# Patient Record
Sex: FEMALE | Race: WHITE | NOT HISPANIC OR LATINO | URBAN - METROPOLITAN AREA
[De-identification: names, ages, dates, MRNs, and addresses within clinical notes are randomized per-mention and may not be internally consistent; named-entity substitution may affect disease eponyms.]

---

## 2018-06-06 ENCOUNTER — EMERGENCY (EMERGENCY)
Facility: HOSPITAL | Age: 83
LOS: 0 days | Discharge: HOME | End: 2018-06-06
Attending: STUDENT IN AN ORGANIZED HEALTH CARE EDUCATION/TRAINING PROGRAM | Admitting: STUDENT IN AN ORGANIZED HEALTH CARE EDUCATION/TRAINING PROGRAM
Payer: MEDICARE

## 2018-06-06 VITALS
HEIGHT: 70 IN | OXYGEN SATURATION: 100 % | RESPIRATION RATE: 18 BRPM | HEART RATE: 74 BPM | WEIGHT: 190.04 LBS | TEMPERATURE: 98 F | SYSTOLIC BLOOD PRESSURE: 141 MMHG | DIASTOLIC BLOOD PRESSURE: 66 MMHG

## 2018-06-06 VITALS
DIASTOLIC BLOOD PRESSURE: 79 MMHG | OXYGEN SATURATION: 100 % | SYSTOLIC BLOOD PRESSURE: 114 MMHG | TEMPERATURE: 99 F | RESPIRATION RATE: 20 BRPM | HEART RATE: 78 BPM

## 2018-06-06 DIAGNOSIS — R53.1 WEAKNESS: ICD-10-CM

## 2018-06-06 DIAGNOSIS — R25.1 TREMOR, UNSPECIFIED: ICD-10-CM

## 2018-06-06 DIAGNOSIS — R42 DIZZINESS AND GIDDINESS: ICD-10-CM

## 2018-06-06 LAB
ANION GAP SERPL CALC-SCNC: 10 MMOL/L — SIGNIFICANT CHANGE UP (ref 5–17)
APPEARANCE UR: CLEAR — SIGNIFICANT CHANGE UP
BACTERIA # UR AUTO: ABNORMAL
BASOPHILS # BLD AUTO: 0.02 K/UL — SIGNIFICANT CHANGE UP (ref 0–0.2)
BASOPHILS NFR BLD AUTO: 0.3 % — SIGNIFICANT CHANGE UP (ref 0–2)
BILIRUB UR-MCNC: NEGATIVE — SIGNIFICANT CHANGE UP
BUN SERPL-MCNC: 12 MG/DL — SIGNIFICANT CHANGE UP (ref 7–23)
CALCIUM SERPL-MCNC: 9.3 MG/DL — SIGNIFICANT CHANGE UP (ref 8.5–10.1)
CHLORIDE SERPL-SCNC: 111 MMOL/L — HIGH (ref 96–108)
CO2 SERPL-SCNC: 24 MMOL/L — SIGNIFICANT CHANGE UP (ref 22–31)
COLOR SPEC: YELLOW — SIGNIFICANT CHANGE UP
CREAT SERPL-MCNC: 0.83 MG/DL — SIGNIFICANT CHANGE UP (ref 0.5–1.3)
DIFF PNL FLD: ABNORMAL
EOSINOPHIL # BLD AUTO: 0.06 K/UL — SIGNIFICANT CHANGE UP (ref 0–0.5)
EOSINOPHIL NFR BLD AUTO: 1 % — SIGNIFICANT CHANGE UP (ref 0–6)
EPI CELLS # UR: SIGNIFICANT CHANGE UP
GLUCOSE SERPL-MCNC: 99 MG/DL — SIGNIFICANT CHANGE UP (ref 70–99)
GLUCOSE UR QL: NEGATIVE MG/DL — SIGNIFICANT CHANGE UP
HCT VFR BLD CALC: 43 % — SIGNIFICANT CHANGE UP (ref 34.5–45)
HGB BLD-MCNC: 14.9 G/DL — SIGNIFICANT CHANGE UP (ref 11.5–15.5)
IMM GRANULOCYTES NFR BLD AUTO: 0.5 % — SIGNIFICANT CHANGE UP (ref 0–1.5)
KETONES UR-MCNC: NEGATIVE — SIGNIFICANT CHANGE UP
LEUKOCYTE ESTERASE UR-ACNC: ABNORMAL
LYMPHOCYTES # BLD AUTO: 1.56 K/UL — SIGNIFICANT CHANGE UP (ref 1–3.3)
LYMPHOCYTES # BLD AUTO: 25.3 % — SIGNIFICANT CHANGE UP (ref 13–44)
MCHC RBC-ENTMCNC: 30.9 PG — SIGNIFICANT CHANGE UP (ref 27–34)
MCHC RBC-ENTMCNC: 34.7 GM/DL — SIGNIFICANT CHANGE UP (ref 32–36)
MCV RBC AUTO: 89.2 FL — SIGNIFICANT CHANGE UP (ref 80–100)
MONOCYTES # BLD AUTO: 0.66 K/UL — SIGNIFICANT CHANGE UP (ref 0–0.9)
MONOCYTES NFR BLD AUTO: 10.7 % — SIGNIFICANT CHANGE UP (ref 2–14)
NEUTROPHILS # BLD AUTO: 3.84 K/UL — SIGNIFICANT CHANGE UP (ref 1.8–7.4)
NEUTROPHILS NFR BLD AUTO: 62.2 % — SIGNIFICANT CHANGE UP (ref 43–77)
NITRITE UR-MCNC: NEGATIVE — SIGNIFICANT CHANGE UP
NRBC # BLD: 0 /100 WBCS — SIGNIFICANT CHANGE UP (ref 0–0)
PH UR: 7 — SIGNIFICANT CHANGE UP (ref 5–8)
PLATELET # BLD AUTO: 148 K/UL — LOW (ref 150–400)
POTASSIUM SERPL-MCNC: 4.2 MMOL/L — SIGNIFICANT CHANGE UP (ref 3.5–5.3)
POTASSIUM SERPL-SCNC: 4.2 MMOL/L — SIGNIFICANT CHANGE UP (ref 3.5–5.3)
PROT UR-MCNC: 15 MG/DL
RBC # BLD: 4.82 M/UL — SIGNIFICANT CHANGE UP (ref 3.8–5.2)
RBC # FLD: 14.6 % — HIGH (ref 10.3–14.5)
RBC CASTS # UR COMP ASSIST: SIGNIFICANT CHANGE UP /HPF (ref 0–4)
SODIUM SERPL-SCNC: 145 MMOL/L — SIGNIFICANT CHANGE UP (ref 135–145)
SP GR SPEC: 1 — LOW (ref 1.01–1.02)
UROBILINOGEN FLD QL: NEGATIVE MG/DL — SIGNIFICANT CHANGE UP
WBC # BLD: 6.17 K/UL — SIGNIFICANT CHANGE UP (ref 3.8–10.5)
WBC # FLD AUTO: 6.17 K/UL — SIGNIFICANT CHANGE UP (ref 3.8–10.5)
WBC UR QL: SIGNIFICANT CHANGE UP

## 2018-06-06 PROCEDURE — 71045 X-RAY EXAM CHEST 1 VIEW: CPT | Mod: 26

## 2018-06-06 PROCEDURE — 99284 EMERGENCY DEPT VISIT MOD MDM: CPT

## 2018-06-06 PROCEDURE — 93010 ELECTROCARDIOGRAM REPORT: CPT

## 2018-06-06 NOTE — ED PROVIDER NOTE - MEDICAL DECISION MAKING DETAILS
82 y/o F with episode of lightheadedness and generalized weakness. Will order labs, EKG, CXR. 80 y/o F with episode of lightheadedness and generalized weakness. Will order labs, EKG, CXR, UA.

## 2018-06-06 NOTE — ED ADULT TRIAGE NOTE - CHIEF COMPLAINT QUOTE
Pt BIBEMS for an anxiety attack that as per EMS resolved but when patient went to stand up she became shaky then agreed to come to ED for evaluation

## 2018-06-06 NOTE — ED PROVIDER NOTE - OBJECTIVE STATEMENT
82 y/o F with PMHx of perfume allergies presents to ED c/o shakiness. Pt states she was in the car and her son and her were late to court. She states that she went in to give her name, worried about being late and she was shaking. She says when she sat down, she realized she was still shaking and started feeling "not so good" and asked to get some help. When she got up she felt dizzy (like he head was foggy). Pt denies any CP, dyspnea. As per son, he was parking the car and when he came back, she was in an ambulance shaking. As per nurse, pt has had 2 court dates in the past 2 weeks which might have caused her to get anxious and become shaky. Non-smoker. Pt denies being on any medication. No PCP. 82 y/o F with no pmhx presents to ED c/o shakiness. Pt states she was in the car and her son and her were late to court. She states that she went in to give her name, worried about being late and she was shaking. She says when she sat down, she realized she was still shaking and started feeling "not so good" and asked to get some help. When she got up she felt dizzy (like he head was foggy). Pt denies any CP, dyspnea. As per son, he was parking the car and when he came back, she was in an ambulance shaking. As per nurse, pt has had 2 court dates in the past 2 weeks which might have caused her to get anxious and become shaky. Non-smoker. Pt denies being on any medication. No PCP.

## 2018-06-06 NOTE — ED PROVIDER NOTE - PROGRESS NOTE DETAILS
Oksana DO: Patient feeling better at this time; neuro exam non focal; gait steady; CXR, UA negative for infectious source, EKG- no STEMI, labs WNL; instructed to f/u with PMD in 1-2 days without fail; strict return precautions given.

## 2018-06-06 NOTE — ED ADULT NURSE NOTE - CHPI ED SYMPTOMS NEG
no dizziness/no pain/no vomiting/no fever/no chills/no decreased eating/drinking/no nausea/no tingling/no weakness/no numbness

## 2018-06-06 NOTE — ED ADULT NURSE NOTE - OBJECTIVE STATEMENT
PT a+o presents to the ed for "shakiness", pt states it began while in court today and has had a recent episode of this occur within the past 2 weeks. No acute distress noted.

## 2018-06-07 LAB
CULTURE RESULTS: NO GROWTH — SIGNIFICANT CHANGE UP
SPECIMEN SOURCE: SIGNIFICANT CHANGE UP

## 2019-04-29 NOTE — ASU PATIENT PROFILE, ADULT - PMH
Cataract    H/O seasonal allergies    OA (osteoarthritis)  knees  Occasional tremors Anxiety    Cataract    H/O seasonal allergies    OA (osteoarthritis)  knees  Occasional tremors

## 2019-04-30 ENCOUNTER — OUTPATIENT (OUTPATIENT)
Dept: OUTPATIENT SERVICES | Facility: HOSPITAL | Age: 84
LOS: 1 days | Discharge: HOME | End: 2019-04-30

## 2019-04-30 VITALS
HEART RATE: 67 BPM | WEIGHT: 179.02 LBS | RESPIRATION RATE: 16 BRPM | TEMPERATURE: 99 F | SYSTOLIC BLOOD PRESSURE: 87 MMHG | HEIGHT: 69 IN | DIASTOLIC BLOOD PRESSURE: 64 MMHG | OXYGEN SATURATION: 100 %

## 2019-04-30 VITALS
SYSTOLIC BLOOD PRESSURE: 129 MMHG | RESPIRATION RATE: 16 BRPM | HEART RATE: 61 BPM | OXYGEN SATURATION: 97 % | DIASTOLIC BLOOD PRESSURE: 44 MMHG

## 2019-04-30 RX ORDER — ACETAMINOPHEN 500 MG
650 TABLET ORAL EVERY 6 HOURS
Qty: 0 | Refills: 0 | Status: DISCONTINUED | OUTPATIENT
Start: 2019-04-30 | End: 2019-05-15

## 2019-04-30 RX ORDER — ACETAMINOPHEN 500 MG
2 TABLET ORAL
Qty: 0 | Refills: 0 | DISCHARGE
Start: 2019-04-30

## 2019-04-30 RX ORDER — PHENYLEPHRINE HCL 2.5 %
1 DROPS OPHTHALMIC (EYE)
Qty: 0 | Refills: 0 | Status: COMPLETED | OUTPATIENT
Start: 2019-04-30 | End: 2019-04-30

## 2019-04-30 RX ORDER — OFLOXACIN 0.3 %
1 DROPS OPHTHALMIC (EYE)
Qty: 0 | Refills: 0 | Status: COMPLETED | OUTPATIENT
Start: 2019-04-30 | End: 2019-04-30

## 2019-04-30 RX ORDER — TROPICAMIDE 1 %
1 DROPS OPHTHALMIC (EYE)
Qty: 0 | Refills: 0 | Status: COMPLETED | OUTPATIENT
Start: 2019-04-30 | End: 2019-04-30

## 2019-04-30 RX ADMIN — Medication 1 DROP(S): at 07:34

## 2019-04-30 RX ADMIN — Medication 1 DROP(S): at 07:49

## 2019-04-30 RX ADMIN — Medication 1 DROP(S): at 07:35

## 2019-04-30 RX ADMIN — Medication 1 DROP(S): at 07:44

## 2019-04-30 RX ADMIN — Medication 1 DROP(S): at 07:43

## 2019-04-30 RX ADMIN — Medication 1 DROP(S): at 07:48

## 2019-04-30 NOTE — BRIEF OPERATIVE NOTE - NSICDXBRIEFPROCEDURE_GEN_ALL_CORE_FT
PROCEDURES:  Phacoemulsification, cataract, complex, with toric IOL insertion 30-Apr-2019 09:26:04  BONG MADDOX

## 2019-05-02 DIAGNOSIS — Z91.040 LATEX ALLERGY STATUS: ICD-10-CM

## 2019-05-02 DIAGNOSIS — J30.2 OTHER SEASONAL ALLERGIC RHINITIS: ICD-10-CM

## 2019-05-02 DIAGNOSIS — H25.12 AGE-RELATED NUCLEAR CATARACT, LEFT EYE: ICD-10-CM

## 2019-05-02 DIAGNOSIS — R25.1 TREMOR, UNSPECIFIED: ICD-10-CM

## 2019-05-02 DIAGNOSIS — H52.202 UNSPECIFIED ASTIGMATISM, LEFT EYE: ICD-10-CM

## 2019-05-02 DIAGNOSIS — Z83.3 FAMILY HISTORY OF DIABETES MELLITUS: ICD-10-CM

## 2019-05-02 DIAGNOSIS — Z79.82 LONG TERM (CURRENT) USE OF ASPIRIN: ICD-10-CM

## 2019-05-02 DIAGNOSIS — F41.9 ANXIETY DISORDER, UNSPECIFIED: ICD-10-CM

## 2019-05-14 PROBLEM — Z88.9 ALLERGY STATUS TO UNSPECIFIED DRUGS, MEDICAMENTS AND BIOLOGICAL SUBSTANCES: Chronic | Status: ACTIVE | Noted: 2019-04-29

## 2019-05-14 PROBLEM — F41.9 ANXIETY DISORDER, UNSPECIFIED: Chronic | Status: ACTIVE | Noted: 2019-04-30

## 2019-05-14 PROBLEM — H26.9 UNSPECIFIED CATARACT: Chronic | Status: ACTIVE | Noted: 2019-04-29

## 2019-05-14 PROBLEM — M19.90 UNSPECIFIED OSTEOARTHRITIS, UNSPECIFIED SITE: Chronic | Status: ACTIVE | Noted: 2019-04-29

## 2019-05-14 PROBLEM — R25.1 TREMOR, UNSPECIFIED: Chronic | Status: ACTIVE | Noted: 2019-04-29

## 2019-05-17 RX ORDER — SODIUM CHLORIDE 9 MG/ML
1000 INJECTION, SOLUTION INTRAVENOUS
Refills: 0 | Status: DISCONTINUED | OUTPATIENT
Start: 2019-05-21 | End: 2019-06-05

## 2019-05-17 RX ORDER — OFLOXACIN 0.3 %
1 DROPS OPHTHALMIC (EYE)
Refills: 0 | Status: DISCONTINUED | OUTPATIENT
Start: 2019-05-21 | End: 2019-06-05

## 2019-05-20 VITALS
SYSTOLIC BLOOD PRESSURE: 134 MMHG | WEIGHT: 179.02 LBS | OXYGEN SATURATION: 100 % | TEMPERATURE: 98 F | HEART RATE: 61 BPM | DIASTOLIC BLOOD PRESSURE: 62 MMHG | HEIGHT: 68 IN | RESPIRATION RATE: 17 BRPM

## 2019-05-21 ENCOUNTER — OUTPATIENT (OUTPATIENT)
Dept: OUTPATIENT SERVICES | Facility: HOSPITAL | Age: 84
LOS: 1 days | Discharge: HOME | End: 2019-05-21

## 2019-05-21 VITALS
DIASTOLIC BLOOD PRESSURE: 70 MMHG | RESPIRATION RATE: 16 BRPM | HEART RATE: 67 BPM | OXYGEN SATURATION: 97 % | SYSTOLIC BLOOD PRESSURE: 139 MMHG

## 2019-05-21 DIAGNOSIS — H25.9 UNSPECIFIED AGE-RELATED CATARACT: Chronic | ICD-10-CM

## 2019-05-21 DIAGNOSIS — H25.11 AGE-RELATED NUCLEAR CATARACT, RIGHT EYE: ICD-10-CM

## 2019-05-21 RX ORDER — PHENYLEPHRINE HCL 2.5 %
1 DROPS OPHTHALMIC (EYE)
Refills: 0 | Status: COMPLETED | OUTPATIENT
Start: 2019-05-21 | End: 2019-05-21

## 2019-05-21 RX ORDER — ACETAMINOPHEN 500 MG
650 TABLET ORAL EVERY 6 HOURS
Refills: 0 | Status: DISCONTINUED | OUTPATIENT
Start: 2019-05-21 | End: 2019-06-05

## 2019-05-21 RX ORDER — TROPICAMIDE 1 %
1 DROPS OPHTHALMIC (EYE)
Refills: 0 | Status: COMPLETED | OUTPATIENT
Start: 2019-05-21 | End: 2019-05-21

## 2019-05-21 RX ADMIN — Medication 1 DROP(S): at 08:35

## 2019-05-21 RX ADMIN — Medication 1 DROP(S): at 08:27

## 2019-05-21 RX ADMIN — Medication 1 DROP(S): at 08:34

## 2019-05-21 RX ADMIN — Medication 1 DROP(S): at 08:40

## 2019-05-21 RX ADMIN — Medication 1 DROP(S): at 08:28

## 2019-05-21 NOTE — ASU DISCHARGE PLAN (ADULT/PEDIATRIC) - CARE PROVIDER_API CALL
Kathya Rhodes)  Ophthalmology  [address] [place] [address]  Phone: [phone]  Fax: [phone]  Follow Up Time:

## 2019-05-26 DIAGNOSIS — H35.30 UNSPECIFIED MACULAR DEGENERATION: ICD-10-CM

## 2019-05-26 DIAGNOSIS — F41.9 ANXIETY DISORDER, UNSPECIFIED: ICD-10-CM

## 2019-05-26 DIAGNOSIS — Z91.040 LATEX ALLERGY STATUS: ICD-10-CM

## 2019-05-26 DIAGNOSIS — Z83.3 FAMILY HISTORY OF DIABETES MELLITUS: ICD-10-CM

## 2019-05-26 DIAGNOSIS — R00.2 PALPITATIONS: ICD-10-CM

## 2019-05-26 DIAGNOSIS — Z96.1 PRESENCE OF INTRAOCULAR LENS: ICD-10-CM

## 2019-05-26 DIAGNOSIS — H25.11 AGE-RELATED NUCLEAR CATARACT, RIGHT EYE: ICD-10-CM

## 2020-11-09 NOTE — ASU PATIENT PROFILE, ADULT - FALL HARM RISK TYPE OF ASSESSMENT
Alert-The patient is alert, awake and responds to voice. The patient is oriented to time, place, and person. The triage nurse is able to obtain subjective information.
Admission

## 2023-01-03 ENCOUNTER — EMERGENCY (EMERGENCY)
Facility: HOSPITAL | Age: 88
LOS: 0 days | Discharge: HOME | End: 2023-01-03
Attending: EMERGENCY MEDICINE
Payer: MEDICARE

## 2023-01-03 VITALS
TEMPERATURE: 99 F | SYSTOLIC BLOOD PRESSURE: 100 MMHG | DIASTOLIC BLOOD PRESSURE: 68 MMHG | HEART RATE: 87 BPM | RESPIRATION RATE: 20 BRPM | OXYGEN SATURATION: 94 %

## 2023-01-03 VITALS
RESPIRATION RATE: 22 BRPM | SYSTOLIC BLOOD PRESSURE: 130 MMHG | OXYGEN SATURATION: 94 % | DIASTOLIC BLOOD PRESSURE: 72 MMHG | WEIGHT: 164.91 LBS | HEART RATE: 109 BPM | TEMPERATURE: 99 F | HEIGHT: 68 IN

## 2023-01-03 DIAGNOSIS — R25.1 TREMOR, UNSPECIFIED: ICD-10-CM

## 2023-01-03 DIAGNOSIS — Z20.822 CONTACT WITH AND (SUSPECTED) EXPOSURE TO COVID-19: ICD-10-CM

## 2023-01-03 DIAGNOSIS — R05.9 COUGH, UNSPECIFIED: ICD-10-CM

## 2023-01-03 DIAGNOSIS — M17.0 BILATERAL PRIMARY OSTEOARTHRITIS OF KNEE: ICD-10-CM

## 2023-01-03 DIAGNOSIS — R41.82 ALTERED MENTAL STATUS, UNSPECIFIED: ICD-10-CM

## 2023-01-03 DIAGNOSIS — R00.0 TACHYCARDIA, UNSPECIFIED: ICD-10-CM

## 2023-01-03 DIAGNOSIS — J11.1 INFLUENZA DUE TO UNIDENTIFIED INFLUENZA VIRUS WITH OTHER RESPIRATORY MANIFESTATIONS: ICD-10-CM

## 2023-01-03 DIAGNOSIS — Z91.040 LATEX ALLERGY STATUS: ICD-10-CM

## 2023-01-03 DIAGNOSIS — R50.9 FEVER, UNSPECIFIED: ICD-10-CM

## 2023-01-03 DIAGNOSIS — F41.9 ANXIETY DISORDER, UNSPECIFIED: ICD-10-CM

## 2023-01-03 DIAGNOSIS — H25.9 UNSPECIFIED AGE-RELATED CATARACT: Chronic | ICD-10-CM

## 2023-01-03 DIAGNOSIS — R06.02 SHORTNESS OF BREATH: ICD-10-CM

## 2023-01-03 LAB
ALBUMIN SERPL ELPH-MCNC: 3.9 G/DL — SIGNIFICANT CHANGE UP (ref 3.3–5)
ALP SERPL-CCNC: 54 U/L — SIGNIFICANT CHANGE UP (ref 40–120)
ALT FLD-CCNC: 15 U/L — SIGNIFICANT CHANGE UP (ref 12–78)
ANION GAP SERPL CALC-SCNC: 13 MMOL/L — SIGNIFICANT CHANGE UP (ref 5–17)
APTT BLD: 37.7 SEC — HIGH (ref 27.5–35.5)
AST SERPL-CCNC: 26 U/L — SIGNIFICANT CHANGE UP (ref 15–37)
BASOPHILS # BLD AUTO: 0.03 K/UL — SIGNIFICANT CHANGE UP (ref 0–0.2)
BASOPHILS NFR BLD AUTO: 0.3 % — SIGNIFICANT CHANGE UP (ref 0–2)
BILIRUB SERPL-MCNC: 1.3 MG/DL — HIGH (ref 0.2–1.2)
BUN SERPL-MCNC: 17 MG/DL — SIGNIFICANT CHANGE UP (ref 7–23)
CALCIUM SERPL-MCNC: 8.6 MG/DL — SIGNIFICANT CHANGE UP (ref 8.5–10.1)
CHLORIDE SERPL-SCNC: 103 MMOL/L — SIGNIFICANT CHANGE UP (ref 96–108)
CO2 SERPL-SCNC: 22 MMOL/L — SIGNIFICANT CHANGE UP (ref 22–31)
CREAT SERPL-MCNC: 0.81 MG/DL — SIGNIFICANT CHANGE UP (ref 0.5–1.3)
EGFR: 70 ML/MIN/1.73M2 — SIGNIFICANT CHANGE UP
EOSINOPHIL # BLD AUTO: 0 K/UL — SIGNIFICANT CHANGE UP (ref 0–0.5)
EOSINOPHIL NFR BLD AUTO: 0 % — SIGNIFICANT CHANGE UP (ref 0–6)
FLUAV H1 2009 PAND RNA SPEC QL NAA+PROBE: DETECTED
GLUCOSE SERPL-MCNC: 149 MG/DL — HIGH (ref 70–99)
HCT VFR BLD CALC: 43.3 % — SIGNIFICANT CHANGE UP (ref 34.5–45)
HGB BLD-MCNC: 15.3 G/DL — SIGNIFICANT CHANGE UP (ref 11.5–15.5)
IMM GRANULOCYTES NFR BLD AUTO: 1.1 % — HIGH (ref 0–0.9)
INR BLD: 1.24 RATIO — HIGH (ref 0.88–1.16)
LACTATE SERPL-SCNC: 1.2 MMOL/L — SIGNIFICANT CHANGE UP (ref 0.7–2)
LYMPHOCYTES # BLD AUTO: 0.88 K/UL — LOW (ref 1–3.3)
LYMPHOCYTES # BLD AUTO: 9.8 % — LOW (ref 13–44)
MANUAL SMEAR VERIFICATION: SIGNIFICANT CHANGE UP
MCHC RBC-ENTMCNC: 29.8 PG — SIGNIFICANT CHANGE UP (ref 27–34)
MCHC RBC-ENTMCNC: 35.3 GM/DL — SIGNIFICANT CHANGE UP (ref 32–36)
MCV RBC AUTO: 84.4 FL — SIGNIFICANT CHANGE UP (ref 80–100)
MONOCYTES # BLD AUTO: 1.85 K/UL — HIGH (ref 0–0.9)
MONOCYTES NFR BLD AUTO: 20.7 % — HIGH (ref 2–14)
NEUTROPHILS # BLD AUTO: 6.09 K/UL — SIGNIFICANT CHANGE UP (ref 1.8–7.4)
NEUTROPHILS NFR BLD AUTO: 68.1 % — SIGNIFICANT CHANGE UP (ref 43–77)
PLAT MORPH BLD: NORMAL — SIGNIFICANT CHANGE UP
PLATELET # BLD AUTO: 129 K/UL — LOW (ref 150–400)
POTASSIUM SERPL-MCNC: 3.4 MMOL/L — LOW (ref 3.5–5.3)
POTASSIUM SERPL-SCNC: 3.4 MMOL/L — LOW (ref 3.5–5.3)
PROT SERPL-MCNC: 7.2 GM/DL — SIGNIFICANT CHANGE UP (ref 6–8.3)
PROTHROM AB SERPL-ACNC: 14.4 SEC — HIGH (ref 10.5–13.4)
RAPID RVP RESULT: DETECTED
RBC # BLD: 5.13 M/UL — SIGNIFICANT CHANGE UP (ref 3.8–5.2)
RBC # FLD: 14.4 % — SIGNIFICANT CHANGE UP (ref 10.3–14.5)
RBC BLD AUTO: SIGNIFICANT CHANGE UP
SARS-COV-2 RNA SPEC QL NAA+PROBE: SIGNIFICANT CHANGE UP
SODIUM SERPL-SCNC: 138 MMOL/L — SIGNIFICANT CHANGE UP (ref 135–145)
TROPONIN I, HIGH SENSITIVITY RESULT: 32.3 NG/L — SIGNIFICANT CHANGE UP
WBC # BLD: 8.95 K/UL — SIGNIFICANT CHANGE UP (ref 3.8–10.5)
WBC # FLD AUTO: 8.95 K/UL — SIGNIFICANT CHANGE UP (ref 3.8–10.5)

## 2023-01-03 PROCEDURE — 71045 X-RAY EXAM CHEST 1 VIEW: CPT | Mod: 26

## 2023-01-03 PROCEDURE — 85730 THROMBOPLASTIN TIME PARTIAL: CPT

## 2023-01-03 PROCEDURE — 71045 X-RAY EXAM CHEST 1 VIEW: CPT

## 2023-01-03 PROCEDURE — 99285 EMERGENCY DEPT VISIT HI MDM: CPT

## 2023-01-03 PROCEDURE — 36415 COLL VENOUS BLD VENIPUNCTURE: CPT

## 2023-01-03 PROCEDURE — 80053 COMPREHEN METABOLIC PANEL: CPT

## 2023-01-03 PROCEDURE — 0225U NFCT DS DNA&RNA 21 SARSCOV2: CPT

## 2023-01-03 PROCEDURE — 83605 ASSAY OF LACTIC ACID: CPT

## 2023-01-03 PROCEDURE — 87040 BLOOD CULTURE FOR BACTERIA: CPT

## 2023-01-03 PROCEDURE — 85025 COMPLETE CBC W/AUTO DIFF WBC: CPT

## 2023-01-03 PROCEDURE — 99285 EMERGENCY DEPT VISIT HI MDM: CPT | Mod: 25

## 2023-01-03 PROCEDURE — 93005 ELECTROCARDIOGRAM TRACING: CPT

## 2023-01-03 PROCEDURE — 84484 ASSAY OF TROPONIN QUANT: CPT

## 2023-01-03 PROCEDURE — 93010 ELECTROCARDIOGRAM REPORT: CPT

## 2023-01-03 PROCEDURE — 85610 PROTHROMBIN TIME: CPT

## 2023-01-03 RX ORDER — SODIUM CHLORIDE 9 MG/ML
1000 INJECTION INTRAMUSCULAR; INTRAVENOUS; SUBCUTANEOUS ONCE
Refills: 0 | Status: COMPLETED | OUTPATIENT
Start: 2023-01-03 | End: 2023-01-03

## 2023-01-03 RX ADMIN — SODIUM CHLORIDE 1000 MILLILITER(S): 9 INJECTION INTRAMUSCULAR; INTRAVENOUS; SUBCUTANEOUS at 17:13

## 2023-01-03 NOTE — ED PROVIDER NOTE - PATIENT PORTAL LINK FT
You can access the FollowMyHealth Patient Portal offered by [facility] by registering at the following website: [email or web address]. By joining [employer]’s FollowMyHealth portal, you will also be able to view your health information using other applications (apps) compatible with our system.

## 2023-01-03 NOTE — ED PROVIDER NOTE - NSFOLLOWUPINSTRUCTIONS_ED_ALL_ED_FT
Influenza, Adult      Influenza, also called "the flu," is a viral infection that mainly affects the respiratory tract. This includes the lungs, nose, and throat. The flu spreads easily from person to person (is contagious). It causes common cold symptoms, along with high fever and body aches.      What are the causes?    This condition is caused by the influenza virus. You can get the virus by:  •Breathing in droplets that are in the air from an infected person's cough or sneeze.      •Touching something that has the virus on it (has been contaminated) and then touching your mouth, nose, or eyes.        What increases the risk?    The following factors may make you more likely to get the flu:  •Not washing or sanitizing your hands often.      •Having close contact with many people during cold and flu season.      •Touching your mouth, eyes, or nose without first washing or sanitizing your hands.      •Not getting an annual flu shot.      You may have a higher risk for the flu, including serious problems, such as a lung infection (pneumonia), if you:  •Are older than 65.      •Are pregnant.      •Have a weakened disease-fighting system (immune system). This includes people who have HIV or AIDS, are on chemotherapy, or are taking medicines that reduce (suppress) the immune system.      •Have a long-term (chronic) illness, such as heart disease, kidney disease, diabetes, or lung disease.      •Have a liver disorder.      •Are severely overweight (morbidly obese).      •Have anemia.      •Have asthma.        What are the signs or symptoms?    Symptoms of this condition usually begin suddenly and last 4–14 days. These may include:  •Fever and chills.      •Headaches, body aches, or muscle aches.      •Sore throat.      •Cough.      •Runny or stuffy (congested) nose.      •Chest discomfort.      •Poor appetite.      •Weakness or fatigue.      •Dizziness.      •Nausea or vomiting.        How is this diagnosed?    This condition may be diagnosed based on:  •Your symptoms and medical history.      •A physical exam.       •Swabbing your nose or throat and testing the fluid for the influenza virus.        How is this treated?    If the flu is diagnosed early, you can be treated with antiviral medicine that is given by mouth (orally) or through an IV. This can help reduce how severe the illness is and how long it lasts.    Taking care of yourself at home can help relieve symptoms. Your health care provider may recommend:  •Taking over-the-counter medicines.      •Drinking plenty of fluids.      In many cases, the flu goes away on its own. If you have severe symptoms or complications, you may be treated in a hospital.      Follow these instructions at home:    Activity     •Rest as needed and get plenty of sleep.      •Stay home from work or school as told by your health care provider. Unless you are visiting your health care provider, avoid leaving home until your fever has been gone for 24 hours without taking medicine.      Eating and drinking     •Take an oral rehydration solution (ORS). This is a drink that is sold at pharmacies and retail stores.      •Drink enough fluid to keep your urine pale yellow.      •Drink clear fluids in small amounts as you are able. Clear fluids include water, ice chips, fruit juice mixed with water, and low-calorie sports drinks.      •Eat bland, easy-to-digest foods in small amounts as you are able. These foods include bananas, applesauce, rice, lean meats, toast, and crackers.      •Avoid drinking fluids that contain a lot of sugar or caffeine, such as energy drinks, regular sports drinks, and soda.      •Avoid alcohol.      •Avoid spicy or fatty foods.        General instructions                   •Take over-the-counter and prescription medicines only as told by your health care provider.    •Use a cool mist humidifier to add humidity to the air in your home. This can make it easier to breathe.  •When using a cool mist humidifier, clean it daily. Empty the water and replace it with clean water.        •Cover your mouth and nose when you cough or sneeze.      •Wash your hands with soap and water often and for at least 20 seconds, especially after you cough or sneeze. If soap and water are not available, use alcohol-based hand .      •Keep all follow-up visits. This is important.        How is this prevented?     •Get an annual flu shot. This is usually available in late summer, fall, or winter. Ask your health care provider when you should get your flu shot.      •Avoid contact with people who are sick during cold and flu season. This is generally fall and winter.        Contact a health care provider if:    •You develop new symptoms.    •You have:  •Chest pain.      •Diarrhea.      •A fever.        •Your cough gets worse.      •You produce more mucus.      •You feel nauseous or you vomit.        Get help right away if you:    •Develop shortness of breath or have difficulty breathing.      •Have skin or nails that turn a bluish color.      •Have severe pain or stiffness in your neck.      •Develop a sudden headache or sudden pain in your face or ear.      •Cannot eat or drink without vomiting.      These symptoms may represent a serious problem that is an emergency. Do not wait to see if the symptoms will go away. Get medical help right away. Call your local emergency services (911 in the [place] Do not drive yourself to the hospital.       Summary    •Influenza, also called "the flu," is a viral infection that primarily affects your respiratory tract.      •Symptoms of the flu usually begin suddenly and last 4–14 days.      •Getting an annual flu shot is the best way to prevent getting the flu.      •Stay home from work or school as told by your health care provider. Unless you are visiting your health care provider, avoid leaving home until your fever has been gone for 24 hours without taking medicine.      •Keep all follow-up visits. This is important.      This information is not intended to replace advice given to you by your health care provider. Make sure you discuss any questions you have with your health care provider.      Document Revised: 08/06/2021 Document Reviewed: 08/06/2021    [employer] Patient Education © 2022 [employer]

## 2023-01-03 NOTE — ED PROVIDER NOTE - NSICDXPASTMEDICALHX_GEN_ALL_CORE_FT
PAST MEDICAL HISTORY:  Anxiety     Cataract     H/O seasonal allergies     OA (osteoarthritis) knees    Occasional tremors

## 2023-01-03 NOTE — ED ADULT TRIAGE NOTE - CHIEF COMPLAINT QUOTE
BIBEMS for fever 102 and productive cough. pt is at baseline mental status, diaphoretic and tachypneic in triage.

## 2023-01-03 NOTE — ED ADULT NURSE NOTE - OBJECTIVE STATEMENT
Pt BIBEMS for fever 102 and productive cough. pt is at baseline mental status, aox2 forgetful. Pt daughter at bedside. Py complains of cough, SOB and wheezing x2 days. Daughter states her mother is not at her baseline mentation. Denies abd pain, CP, LE swelling. +sick

## 2023-01-03 NOTE — ED PROVIDER NOTE - NSICDXPASTSURGICALHX_GEN_ALL_CORE_FT
PAST SURGICAL HISTORY:  Age-related cataract of both eyes, unspecified age-related cataract type L

## 2023-01-03 NOTE — ED PROVIDER NOTE - OBJECTIVE STATEMENT
GI Discharge Instructions Endoscopy      9/10/2020    During your exam, the physician:    Removed polyps and Lab results will be called/mailed to you within 7-10 days.  If you have not heard from the doctor within 10 days, call the office for results:  Dr. SARA Bae    DIET INSTRUCTIONS:  Resume your regular diet    PRESCRIPTIONS/MEDICATIONS  No new prescriptions given today    A RESPONSIBLE ADULT MUST ACCOMPANY YOU AND DRIVE YOU HOME    You had the following procedure(s) today:   Endoscopic Ultrasound    1. If a biopsy and/or a polyp removal was performed today.   There is no need to hold any aspirin or anti-inflammatory medications.   2. Following sedation, your judgement, perception and coordination are impaired.      Therefore, TODAY:  · Do not drive.  Do not return to work today.  · Do not operate appliances or machinery that require quick reaction time for 24 hours.  · Do not sign legal documents or be involved in work decisions for 24 hours.  · Do not smoke or drink alcoholic beverages for 24 hours.  · Plan to spend a few hours resting before resuming your normal routine    Please call your physician in the event that you experience any of the following or proceed to the nearest hospital in the event of an emergency:     UPPER ENDOSCOPY:    Difficulty swallowing or breathing  Neck swelling  Excessive pain, you may have mild chest pain or discomfort which should pass within 1-2 hours with the passage of air.  Nausea or Vomiting  Abdominal distention  Fever  A mild sore throat may follow this procedure.  Warm salt-water gargle or lozenges may relieve your discomfort.  ..    If you have any questions or concerns, contact Dr. SARA Bae.    ADDITIONAL INSTRUCTIONS:   Recommendation:  Follow-up biopsy report.  No need for further follow-up with the endoscopic ultrasound for gastric lesion.  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 Coffey       87 y/o female with a PMHx of anxiety, cataracts, seasonal allergies, OA, occasional tremor presents to the ED c/o fever, Tmax 103F, cough, SOB and wheezing x2 days. Daughter reports pt has been more altered. Denies abd pain, CP, LE swelling. +sick contacts. No other complaints at this time.

## 2023-01-08 LAB
CULTURE RESULTS: SIGNIFICANT CHANGE UP
CULTURE RESULTS: SIGNIFICANT CHANGE UP
SPECIMEN SOURCE: SIGNIFICANT CHANGE UP
SPECIMEN SOURCE: SIGNIFICANT CHANGE UP

## 2023-01-15 NOTE — ED POST DISCHARGE NOTE - DETAILS
LMTCB on home number to give results- KAILEE RYDER spoke to pt daughter she is aware of her labs and imaging results and will fu with [facility] KAILEE RYDER

## 2023-10-12 ENCOUNTER — EMERGENCY (EMERGENCY)
Facility: HOSPITAL | Age: 88
LOS: 0 days | Discharge: HOME | End: 2023-10-12
Attending: STUDENT IN AN ORGANIZED HEALTH CARE EDUCATION/TRAINING PROGRAM
Payer: MEDICARE

## 2023-10-12 VITALS
DIASTOLIC BLOOD PRESSURE: 94 MMHG | SYSTOLIC BLOOD PRESSURE: 129 MMHG | RESPIRATION RATE: 18 BRPM | OXYGEN SATURATION: 100 % | HEART RATE: 54 BPM | TEMPERATURE: 99 F

## 2023-10-12 VITALS — WEIGHT: 175.05 LBS | HEIGHT: 67 IN
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
## 2023-10-12 DIAGNOSIS — M17.0 BILATERAL PRIMARY OSTEOARTHRITIS OF KNEE: ICD-10-CM

## 2023-10-12 DIAGNOSIS — H25.9 UNSPECIFIED AGE-RELATED CATARACT: Chronic | ICD-10-CM

## 2023-10-12 DIAGNOSIS — R07.81 PLEURODYNIA: ICD-10-CM
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
## 2023-10-12 DIAGNOSIS — H26.9 UNSPECIFIED CATARACT: ICD-10-CM

## 2023-10-12 DIAGNOSIS — Z91.040 LATEX ALLERGY STATUS: ICD-10-CM

## 2023-10-12 DIAGNOSIS — T14.8XXA OTHER INJURY OF UNSPECIFIED BODY REGION, INITIAL ENCOUNTER: ICD-10-CM

## 2023-10-12 DIAGNOSIS — F41.9 ANXIETY DISORDER, UNSPECIFIED: ICD-10-CM

## 2023-10-12 PROCEDURE — 99284 EMERGENCY DEPT VISIT MOD MDM: CPT | Mod: 25

## 2023-10-12 PROCEDURE — 71250 CT THORAX DX C-: CPT | Mod: MA

## 2023-10-12 PROCEDURE — 71250 CT THORAX DX C-: CPT | Mod: 26,MA

## 2023-10-12 PROCEDURE — 99284 EMERGENCY DEPT VISIT MOD MDM: CPT

## 2023-10-12 RX ORDER — ACETAMINOPHEN 500 MG
650 TABLET ORAL ONCE
Refills: 0 | Status: COMPLETED | OUTPATIENT
Start: 2023-10-12 | End: 2023-10-12

## 2023-10-12 NOTE — ED ADULT NURSE NOTE - OBJECTIVE STATEMENT
Pt is a 87 y/o female who present to the ED with c/o right rib pain x 2 days.  Pt states she was leaning over to grab something and twisted the wrong way and feels like she fractured her rib. Pt denies N/V, chest pain or SOB. Pt is a 89 y/o female who present to the ED with c/o right rib pain x 2 days.  Pt states she was leaning over to grab something and twisted the wrong way and feels like she fractured her rib. Pt denies N/V, chest pain or SOB. Pt daughter is at the bedside. Pt denies any other complaints.

## 2023-10-12 NOTE — ED ADULT NURSE NOTE - NSFALLHARMRISKINTERV_ED_ALL_ED

## 2023-10-12 NOTE — ED PROVIDER NOTE - OBJECTIVE STATEMENT
87 y/o female with no pertinent PMHx not on any daily medications, baseline ambulates with cane on right side presents to the ED c/o right lower rib pain. Pt reports about 2 days ago bent over to pick something from the floor. Pt felt like she was going to fall, reached over to grab on to bed with her RUE to stop her fall and twisted the right side of her torso. Denies fall, headstrike, or direct injury to the right side. Since then pt with worsening right rib pain that is worse with lateral movement of the torso. Took aspirin at 9AM yesterday with no relief. Denies numbness or tingling of the UEs, neck pain, back pain, nausea, vomiting, fatigue, urinary incontinence, or chest pain.

## 2023-10-12 NOTE — ED PROVIDER NOTE - NSFOLLOWUPINSTRUCTIONS_ED_ALL_ED_FT
Follow-up with her primary care doctor regarding her CAT scan results.    Seek immediate medical assistance for any new or worsening symptoms. If you have issues obtaining follow up, please call: [phone] or [phone]  to obtain a doctor or specialist who takes your insurance in your area.    Please take 600 mg of Ibuprofen (aka Motrin, Advil) and/or 650 mg Acetaminophen (aka Tylenol) every 6 hours, as needed, for mild-moderate pain.    Please do not take these medications if you do not have pain or if you have any history of bleeding disorders, kidney or liver disease.   Do not use ibuprofen if you are on blood thinners (anti-coagulation).

## 2023-10-12 NOTE — ED PROVIDER NOTE - PATIENT PORTAL LINK FT
You can access the FollowMyHealth Patient Portal offered by [facility] by registering at the following website: [email or web address]. By joining [employer]’s FollowMyHealth portal, you will also be able to view your health information using other applications (apps) compatible with our system.

## 2023-10-12 NOTE — ED PROVIDER NOTE - CLINICAL SUMMARY MEDICAL DECISION MAKING FREE TEXT BOX
87 y/o female no PMHx presenting with right-sided rib pain following twist injury 3 days ago. On exam pt with pain with ROM and tenderness to the region.  ***** 87 y/o female no PMHx presenting with right-sided rib pain following twist injury 3 days ago. On exam pt with pain with ROM and tenderness to the region.   CT negative for fracture but there is an incidental finding which the patient will follow-up with her primary care doctor for.

## 2023-10-12 NOTE — ED PROVIDER NOTE - MUSCULOSKELETAL, MLM
Pain with rotation of the spinal rotation of the right side. Mild pain with spinal flexion. Mild TTP of the right ribs.

## 2023-10-12 NOTE — ED PROVIDER NOTE - NSICDXPASTSURGICALHX_GEN_ALL_CORE_FT
PAST SURGICAL HISTORY:  Age-related cataract of both eyes, unspecified age-related cataract type L

## 2023-10-12 NOTE — ED ADULT TRIAGE NOTE - CHIEF COMPLAINT QUOTE
Pt presents to ER c/o right lower rib pain. Pt reports 3 days PTA she twisted her body then sat down and it began to hurt. Denies injury/fall/bloodthinners

## 2024-07-22 ENCOUNTER — EMERGENCY (EMERGENCY)
Facility: HOSPITAL | Age: 89
LOS: 0 days | Discharge: HOME | End: 2024-07-23
Attending: EMERGENCY MEDICINE
Payer: MEDICARE

## 2024-07-22 VITALS
DIASTOLIC BLOOD PRESSURE: 96 MMHG | RESPIRATION RATE: 16 BRPM | TEMPERATURE: 99 F | SYSTOLIC BLOOD PRESSURE: 169 MMHG | OXYGEN SATURATION: 95 % | HEART RATE: 100 BPM

## 2024-07-22 DIAGNOSIS — H25.9 UNSPECIFIED AGE-RELATED CATARACT: Chronic | ICD-10-CM

## 2024-07-22 DIAGNOSIS — S42.002A FRACTURE OF UNSPECIFIED PART OF LEFT CLAVICLE, INITIAL ENCOUNTER FOR CLOSED FRACTURE: ICD-10-CM

## 2024-07-22 DIAGNOSIS — F41.9 ANXIETY DISORDER, UNSPECIFIED: ICD-10-CM

## 2024-07-22 DIAGNOSIS — M19.90 UNSPECIFIED OSTEOARTHRITIS, UNSPECIFIED SITE: ICD-10-CM

## 2024-07-22 DIAGNOSIS — S20.219A CONTUSION OF UNSPECIFIED FRONT WALL OF THORAX, INITIAL ENCOUNTER: ICD-10-CM

## 2024-07-22 DIAGNOSIS — S19.80XA OTHER SPECIFIED INJURIES OF UNSPECIFIED PART OF NECK, INITIAL ENCOUNTER: ICD-10-CM
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
## 2024-07-22 DIAGNOSIS — Z91.040 LATEX ALLERGY STATUS: ICD-10-CM
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
## 2024-07-22 LAB
ABO RH CONFIRMATION: SIGNIFICANT CHANGE UP
ALBUMIN SERPL ELPH-MCNC: 4.3 G/DL — SIGNIFICANT CHANGE UP (ref 3.3–5)
ALP SERPL-CCNC: 71 U/L — SIGNIFICANT CHANGE UP (ref 40–120)
ALT FLD-CCNC: 17 U/L — SIGNIFICANT CHANGE UP (ref 12–78)
ANION GAP SERPL CALC-SCNC: 9 MMOL/L — SIGNIFICANT CHANGE UP (ref 5–17)
APPEARANCE UR: CLEAR — SIGNIFICANT CHANGE UP
APTT BLD: 36 SEC — HIGH (ref 24.5–35.6)
AST SERPL-CCNC: 19 U/L — SIGNIFICANT CHANGE UP (ref 15–37)
BACTERIA # UR AUTO: ABNORMAL /HPF
BASOPHILS # BLD AUTO: 0.05 K/UL — SIGNIFICANT CHANGE UP (ref 0–0.2)
BASOPHILS NFR BLD AUTO: 0.3 % — SIGNIFICANT CHANGE UP (ref 0–2)
BILIRUB SERPL-MCNC: 1.2 MG/DL — SIGNIFICANT CHANGE UP (ref 0.2–1.2)
BILIRUB UR-MCNC: NEGATIVE — SIGNIFICANT CHANGE UP
BLD GP AB SCN SERPL QL: SIGNIFICANT CHANGE UP
BUN SERPL-MCNC: 17 MG/DL — SIGNIFICANT CHANGE UP (ref 7–23)
CALCIUM SERPL-MCNC: 9.3 MG/DL — SIGNIFICANT CHANGE UP (ref 8.5–10.1)
CHLORIDE SERPL-SCNC: 109 MMOL/L — HIGH (ref 96–108)
CK SERPL-CCNC: 50 U/L — SIGNIFICANT CHANGE UP (ref 26–192)
CO2 SERPL-SCNC: 25 MMOL/L — SIGNIFICANT CHANGE UP (ref 22–31)
COLOR SPEC: YELLOW — SIGNIFICANT CHANGE UP
CREAT SERPL-MCNC: 0.87 MG/DL — SIGNIFICANT CHANGE UP (ref 0.5–1.3)
DIFF PNL FLD: ABNORMAL
EGFR: 64 ML/MIN/1.73M2 — SIGNIFICANT CHANGE UP
EOSINOPHIL # BLD AUTO: 0.01 K/UL — SIGNIFICANT CHANGE UP (ref 0–0.5)
EOSINOPHIL NFR BLD AUTO: 0.1 % — SIGNIFICANT CHANGE UP (ref 0–6)
GLUCOSE SERPL-MCNC: 129 MG/DL — HIGH (ref 70–99)
GLUCOSE UR QL: NEGATIVE MG/DL — SIGNIFICANT CHANGE UP
HCT VFR BLD CALC: 46.1 % — HIGH (ref 34.5–45)
HGB BLD-MCNC: 15.5 G/DL — SIGNIFICANT CHANGE UP (ref 11.5–15.5)
IMM GRANULOCYTES NFR BLD AUTO: 1.4 % — HIGH (ref 0–0.9)
INR BLD: 1.06 RATIO — SIGNIFICANT CHANGE UP (ref 0.85–1.18)
KETONES UR-MCNC: 15 MG/DL
LEUKOCYTE ESTERASE UR-ACNC: ABNORMAL
LYMPHOCYTES # BLD AUTO: 1.66 K/UL — SIGNIFICANT CHANGE UP (ref 1–3.3)
LYMPHOCYTES # BLD AUTO: 10.7 % — LOW (ref 13–44)
MCHC RBC-ENTMCNC: 28.7 PG — SIGNIFICANT CHANGE UP (ref 27–34)
MCHC RBC-ENTMCNC: 33.6 GM/DL — SIGNIFICANT CHANGE UP (ref 32–36)
MCV RBC AUTO: 85.4 FL — SIGNIFICANT CHANGE UP (ref 80–100)
MONOCYTES # BLD AUTO: 1.84 K/UL — HIGH (ref 0–0.9)
MONOCYTES NFR BLD AUTO: 11.9 % — SIGNIFICANT CHANGE UP (ref 2–14)
NEUTROPHILS # BLD AUTO: 11.67 K/UL — HIGH (ref 1.8–7.4)
NEUTROPHILS NFR BLD AUTO: 75.6 % — SIGNIFICANT CHANGE UP (ref 43–77)
NITRITE UR-MCNC: NEGATIVE — SIGNIFICANT CHANGE UP
PH UR: 6 — SIGNIFICANT CHANGE UP (ref 5–8)
PLATELET # BLD AUTO: 142 K/UL — LOW (ref 150–400)
POTASSIUM SERPL-MCNC: 3.8 MMOL/L — SIGNIFICANT CHANGE UP (ref 3.5–5.3)
POTASSIUM SERPL-SCNC: 3.8 MMOL/L — SIGNIFICANT CHANGE UP (ref 3.5–5.3)
PROT SERPL-MCNC: 7.3 GM/DL — SIGNIFICANT CHANGE UP (ref 6–8.3)
PROT UR-MCNC: 30 MG/DL
PROTHROM AB SERPL-ACNC: 12 SEC — SIGNIFICANT CHANGE UP (ref 9.5–13)
RBC # BLD: 5.4 M/UL — HIGH (ref 3.8–5.2)
RBC # FLD: 14.2 % — SIGNIFICANT CHANGE UP (ref 10.3–14.5)
RBC CASTS # UR COMP ASSIST: 23 /HPF — HIGH (ref 0–4)
SODIUM SERPL-SCNC: 143 MMOL/L — SIGNIFICANT CHANGE UP (ref 135–145)
SP GR SPEC: >1.03 — HIGH (ref 1–1.03)
SQUAMOUS # UR AUTO: 12 /HPF — HIGH (ref 0–5)
TROPONIN I, HIGH SENSITIVITY RESULT: 11.78 NG/L — SIGNIFICANT CHANGE UP
UROBILINOGEN FLD QL: 0.2 MG/DL — SIGNIFICANT CHANGE UP (ref 0.2–1)
WBC # BLD: 15.45 K/UL — HIGH (ref 3.8–10.5)
WBC # FLD AUTO: 15.45 K/UL — HIGH (ref 3.8–10.5)
WBC UR QL: 13 /HPF — HIGH (ref 0–5)

## 2024-07-22 PROCEDURE — 36415 COLL VENOUS BLD VENIPUNCTURE: CPT

## 2024-07-22 PROCEDURE — 72170 X-RAY EXAM OF PELVIS: CPT | Mod: 26

## 2024-07-22 PROCEDURE — 73060 X-RAY EXAM OF HUMERUS: CPT | Mod: LT

## 2024-07-22 PROCEDURE — 86901 BLOOD TYPING SEROLOGIC RH(D): CPT

## 2024-07-22 PROCEDURE — 70450 CT HEAD/BRAIN W/O DYE: CPT | Mod: 26,MC

## 2024-07-22 PROCEDURE — 74177 CT ABD & PELVIS W/CONTRAST: CPT | Mod: MC

## 2024-07-22 PROCEDURE — 71045 X-RAY EXAM CHEST 1 VIEW: CPT

## 2024-07-22 PROCEDURE — 72170 X-RAY EXAM OF PELVIS: CPT

## 2024-07-22 PROCEDURE — 74177 CT ABD & PELVIS W/CONTRAST: CPT | Mod: 26,MC

## 2024-07-22 PROCEDURE — 73030 X-RAY EXAM OF SHOULDER: CPT | Mod: LT

## 2024-07-22 PROCEDURE — 73030 X-RAY EXAM OF SHOULDER: CPT | Mod: 26,LT

## 2024-07-22 PROCEDURE — 99285 EMERGENCY DEPT VISIT HI MDM: CPT | Mod: 25

## 2024-07-22 PROCEDURE — 85025 COMPLETE CBC W/AUTO DIFF WBC: CPT

## 2024-07-22 PROCEDURE — 87186 SC STD MICRODIL/AGAR DIL: CPT

## 2024-07-22 PROCEDURE — 71260 CT THORAX DX C+: CPT | Mod: 26,MC

## 2024-07-22 PROCEDURE — 73060 X-RAY EXAM OF HUMERUS: CPT | Mod: 26,LT

## 2024-07-22 PROCEDURE — 85610 PROTHROMBIN TIME: CPT

## 2024-07-22 PROCEDURE — 86900 BLOOD TYPING SEROLOGIC ABO: CPT

## 2024-07-22 PROCEDURE — 82550 ASSAY OF CK (CPK): CPT

## 2024-07-22 PROCEDURE — 86850 RBC ANTIBODY SCREEN: CPT

## 2024-07-22 PROCEDURE — 81001 URINALYSIS AUTO W/SCOPE: CPT

## 2024-07-22 PROCEDURE — 93005 ELECTROCARDIOGRAM TRACING: CPT

## 2024-07-22 PROCEDURE — 99285 EMERGENCY DEPT VISIT HI MDM: CPT

## 2024-07-22 PROCEDURE — 87086 URINE CULTURE/COLONY COUNT: CPT

## 2024-07-22 PROCEDURE — 72125 CT NECK SPINE W/O DYE: CPT | Mod: 26,MC

## 2024-07-22 PROCEDURE — 84484 ASSAY OF TROPONIN QUANT: CPT

## 2024-07-22 PROCEDURE — 71260 CT THORAX DX C+: CPT | Mod: MC

## 2024-07-22 PROCEDURE — 71045 X-RAY EXAM CHEST 1 VIEW: CPT | Mod: 26

## 2024-07-22 PROCEDURE — 70450 CT HEAD/BRAIN W/O DYE: CPT | Mod: MC

## 2024-07-22 PROCEDURE — 93010 ELECTROCARDIOGRAM REPORT: CPT

## 2024-07-22 PROCEDURE — 85730 THROMBOPLASTIN TIME PARTIAL: CPT

## 2024-07-22 PROCEDURE — 72125 CT NECK SPINE W/O DYE: CPT | Mod: MC

## 2024-07-22 PROCEDURE — 80053 COMPREHEN METABOLIC PANEL: CPT

## 2024-07-22 RX ORDER — SODIUM CHLORIDE 0.9 % (FLUSH) 0.9 %
500 SYRINGE (ML) INJECTION ONCE
Refills: 0 | Status: COMPLETED | OUTPATIENT
Start: 2024-07-22 | End: 2024-07-22

## 2024-07-22 RX ADMIN — Medication 500 MILLILITER(S): at 21:01

## 2024-07-22 NOTE — ED ADULT NURSE NOTE - OBJECTIVE STATEMENT
pt presents to ED s/p fall in shower. Pt states "floor was slippery". Denies headstrike, LOC, or pain. Pt placed in c-collar on arrival. Bruising noted to chest. No deformities, bleeding or abrasions, noted across the body. No acute distress noted.

## 2024-07-22 NOTE — ED PROVIDER NOTE - CARE PROVIDER_API CALL
Eliezer Morrissey  Orthopaedic Surgery  [address] [place] [address]  Phone: [phone]  Fax: [phone]  Follow Up Time:     Vikash Delgadillo  Orthopaedic Surgery  [address] [place] [address]  Phone: [phone]  Fax: [phone]  Follow Up Time:

## 2024-07-22 NOTE — ED PROVIDER NOTE - NS ED MD DISPO DISCHARGE
Anesthesia Start Date/Time: 03/28/23 1405    Scheduled providers: Sidra Tejada M.D.; Sudhir Marroquin M.D.    Procedure: CL-EP ABLATION ATRIAL FIBRILLATION    Diagnosis:       Atrial fibrillation, new onset (HCC) [I48.91]      Unspecified atrial fibrillation [I48.91]    Indications: See Associated Dx    Location: [facility] - CATH LAB - [facility]          Relevant Problems   CARDIAC   (positive) Atrial fibrillation, new onset (HCC)   (positive) Coronary artery disease involving native coronary artery of native heart without angina pectoris   (positive) Primary hypertension       Physical Exam    Airway   Mallampati: II  TM distance: >3 FB  Neck ROM: full       Cardiovascular - normal exam  Rhythm: regular  Rate: normal  (-) murmur     Dental   (+) upper dentures, lower dentures           Pulmonary - normal exam  Breath sounds clear to auscultation     Abdominal   (-) obese     Neurological - normal exam                 Anesthesia Plan    ASA 3   ASA physical status 3 criteria: diabetes - poorly controlled    Plan - general       Airway plan will be ETT  LEONEL Planned        Induction: intravenous    Postoperative Plan: Postoperative administration of opioids is intended.    Pertinent diagnostic labs and testing reviewed    Informed Consent:    Anesthetic plan and risks discussed with patient.    Use of blood products discussed with: patient whom consented to blood products.          Home

## 2024-07-22 NOTE — ED PROVIDER NOTE - NSFOLLOWUPINSTRUCTIONS_ED_ALL_ED_FT
Tylenol 325 mg 2 tabs every 6 hours as needed for headache, aches & pains.  Continue your regular medicine as per routine.  Topical ice packs to bruised chest wall area as needed to help ease the discomfort & swelling.  Follow up next 2 days with your primary doctor.  Follow up within 1 week with orthopedist, as listed below.  Minimize left arm movement and holding/pulling on things.  Return to ED if short of breath, worsening chest pain/swelling, vomiting, abdominal pain, arm weak or numb, or other concern.  Wear left arm sling as often as possible. Keep left elbow bent at 90 degrees inside the sling.

## 2024-07-22 NOTE — ED ADULT NURSE NOTE - NSFALLHARMRISKINTERV_ED_ALL_ED

## 2024-07-22 NOTE — ED PROVIDER NOTE - OBJECTIVE STATEMENT
89 year old with PMHx of anxiety, OA; presents to ED c/o anterior chest wall after she fell forward into shower switch,  reports she lost her balance on wet floor at 10AM. Patient reports she was unable to get up on her own and remained on floor for hours. No LOC, head-strike. Denies HA, neck pain. Chronic B/L lower extremity pain, no worse than usual. Denies urinary/fecal incontinence. +C-collar placed by EMS. 89 year old with PMHx of anxiety & OA, BIBA from home to ED c/o injury to anterior chest wall after she fell forwards against shower knob/switch, reports she lost her balance on wet shower floor at 10AM. Patient reports she then slid slowly down onto the floor w/o further injury & was unable to get up on her own and remained on floor for hours until daughter came by to check on her & found her in bathroom. No LOC, head-strike. Denies HA, neck pain. Chronic B/L lower extremity pain, no worse than usual. Denies urinary/fecal incontinence. +C-collar placed by EMS.  No ASA nor AC meds use.

## 2024-07-22 NOTE — ED PROVIDER NOTE - PATIENT PORTAL LINK FT
You can access the FollowMyHealth Patient Portal offered by [facility] by registering at the following website: [email or web address]. By joining [employer]’s FollowMyHealth portal, you will also be able to view your health information using other applications (apps) compatible with our system.

## 2024-07-22 NOTE — ED ADULT TRIAGE NOTE - CHIEF COMPLAINT QUOTE
pt BIBA from home, pt states she was getting out shower, and the glass door hit her. no fall. Pt reporting chest and neck pain. Pt has bruise to chest. No blood thinners. Collared by EMS

## 2024-07-22 NOTE — ED PROVIDER NOTE - CARE PLAN
Principal Discharge DX:	Closed fracture of clavicle, initial encounter  Secondary Diagnosis:	Hematoma of muscle  Secondary Diagnosis:	Contusion of chest wall, initial encounter   1

## 2024-07-22 NOTE — ED ADULT TRIAGE NOTE - TEMPERATURE IN CELSIUS (DEGREES C)
Hospitalist Progress Note    NAME: Peg Molina   :  1944   MRN:  [id number]     Patient transferred from OSH where he was admitted with worsening weakness. PTA the family report that the patient was having progressive weakness, difficulty ambulating, and urinary incontinence. Of note, the patient had a similar episode earlier this year. While hospitalized the patient began to spike fevers. He underwent an extensive ID workup as well as general medical and neurologic workup which did not identify a source of fevers or answers as to why the patient's weakness progressed so profoundly. He was transferred to [address] [place] [address] because the patient and family reside in [place] and they wished for him to be closer to home for ongoing diagnostic workup. Assessment / Plan:  Parkinson's disease  Parkinson's associated dementia    Metabolic encephalopathy  Spinal stenosis, multilevel  Allodynia   - Neuro input appreciated. - Continue carbidopa-levodopa er  mg po tid. - Continue donepezil 10 mg po daily. - Continue pramipexole 0.5 mg po tid. - Vitamin D and B12 levels WNL. FUO  - ID input appreciated. - Afebrile currently. - Workup at OSH negative. - CRP and sed rate remain elevated. HTN  Dyslipidemia   - Overall adequate BP control.  - Continue nifedipine er 30 mg po daily.    - Resume atorvastatin 20 mg po daily, CK WNL. DM II  Hyperglycemia   - Continue to hold oral diabetes meds. - Add low dose basal (glargine) insulin, 5 units sc daily. - Continue FSBS with SSI correction scale. CKD III  - Renal function WNL currently. - Continue to avoid nephrotoxins as able. - Monitor. Anemia of chronic disease   - H/H stable. - No tx indicated at this time. - Monitor. COPD  Remote tobacco use  - Continue arformoterol/budesonide 15/250 mcg nebs bid.    - Continue albuterol/ipratropium 2.5/0.5 mg nebs q4h prn. GERD  - Change pantoprazole to 40 mg po bid. BPH  - Continue finasteride 5 mg po daily. Estimated discharge date:  TBD  Barriers:  None identified     Code status: Full  Prophylaxis: Lovenox  Recommended Disposition: TBD     Subjective:     Chief Complaint / Reason for Physician Visit  Patient aware that he is at [facility]. When told that he was in [place] in the hospital he states \"I didn't know that\". Plan of care and pertinent events reviewed with bedside nurse. Review of Systems:  Symptom Y/N Comments  Symptom Y/N Comments   Fever/Chills Y   Chest Pain     Poor Appetite    Edema Y    Cough Y   Abdominal Pain     Sputum N   Joint Pain     SOB/COTE N   Pruritis/Rash     Nausea/vomit N   Tolerating PT/OT     Diarrhea N   Tolerating Diet Y    Constipation    Other Y Allodynia      Could NOT obtain due to: AMS     Objective:     VITALS:   Last 24hrs VS reviewed since prior progress note. Most recent are:  Patient Vitals for the past 24 hrs:   Temp Pulse Resp BP SpO2   06/09/21 0131 98.1 °F (36.7 °C) (!) 103 16 135/86 94 %   06/08/21 2054 99.2 °F (37.3 °C) (!) 106 16 (!) 155/85 92 %   06/08/21 2046 -- -- -- -- 94 %   06/08/21 1444 97.9 °F (36.6 °C) 99 16 139/83 94 %   06/08/21 0953 98.7 °F (37.1 °C) 99 16 139/85 94 %     No intake or output data in the 24 hours ending 06/09/21 0741     I had a face to face encounter and independently examined this patient on 6/9/2021, as outlined below:  PHYSICAL EXAM:  General:  A/A.  NAD. Complains of pain when touched, even lightly. Complains of pain when sheet is moved. HEENT:  Normocephalic. Sclera anicteric. Mucous membranes moist.    Chest:  Resps even/unlabored with symmetrical CWE. Air entry diminished at bases. Lungs CTA. No use of accessory muscles. CV:  RRR. Normal S1/S2. No M/C/R appreciated. No JVD. Generalized edema noted. GI:  Abdomen soft/ND. ABT X 4.    :  Voiding. Neurologic:  Face symmetrical.  Voice soft. Unable/unwilling to move RUE. LUE strength 1/5.   Abad LE strength 1/5.  Patient hesitant to move 2/2 pain. Psych:  Cooperative. No agitation. Skin:  No rashes or jaundice. Turgor elastic. Generalized edema. Reviewed most current lab test results and cultures  YES  Reviewed most current radiology test results   YES  Review and summation of old records today    NO  Reviewed patient's current orders and MAR    YES  PMH/SH reviewed - no change compared to H&P  ________________________________________________________________________  Care Plan discussed with:    Comments   Patient [address] [place]     Consultant                        Multidiciplinary team rounds were held today with , nursing, pharmacist and clinical coordinator. Patient's plan of care was discussed; medications were reviewed and discharge planning was addressed. ________________________________________________________________________  Surjit Arciniega NP     Procedures: see electronic medical records for all procedures/Xrays and details which were not copied into this note but were reviewed prior to creation of Plan. LABS:  I reviewed today's most current labs and imaging studies.   Pertinent labs include:  Recent Labs     06/09/21  0139 06/08/21  0300   WBC 7.8 8.9   HGB 10.4* 10.1*   HCT 33.8* 32.8*    312     Recent Labs     06/09/21  0139 06/08/21  1238 06/08/21  0300     --  137   K 4.5  --  4.2     --  110*   CO2 22  --  23   *  --  119*   BUN 16  --  23*   CREA 0.84  --  0.89   CA 9.1  --  9.1   MG 2.2  --  2.1   ALB  --  2.0*  --    TBILI  --  0.7  --    ALT  --  13  --        Signed: Surjit Arciniega NP 37.2

## 2024-07-22 NOTE — ED PROVIDER NOTE - CLINICAL SUMMARY MEDICAL DECISION MAKING FREE TEXT BOX
89 year old with PMHx of anxiety, OA; presents to ED c/o anterior chest wall after she fell forward into shower switch,  reports she lost her balance on wet floor at 10AM. Patient reports she was unable to get up on her own and remained on floor for hours. No LOC, head-strike. +pain mid chest and left shoulder. Mid chest with ecchymosis. Clinical concern for trauma and possible rhabdomyolysis. Patient not a TA candidate. Plan EKG, XR chest, left shoulder/humerus, labs including coags, troponin, CPK, UA, IV fluids, CT head, c-spine, c/a/p, IV Tylenol for pain, fall precautions. Monitor, observe and reassess 89 year old with PMHx of anxiety, OA; presents to ED c/o anterior chest wall after she fell forward into shower switch,  reports she lost her balance on wet floor at 10AM. Patient reports she was unable to get up on her own and remained on floor for hours. No LOC, head-strike. +pain mid chest and left shoulder. Mid chest with ecchymosis. Clinical concern for trauma and possible rhabdomyolysis. Patient not a TA candidate.   Plan EKG, XR chest, left shoulder/humerus, labs including coags, troponin, CPK, UA, IV fluids, CT head, c-spine, c/a/p, IV Tylenol for pain, fall precautions. Monitor, observe and reassess    23:00, CHRISTOPHER Oliver MD:  CTs + only for L clavicular head f x w/ associated m. hematoma.  LLabs not actionable, U/A contaminated sample, pt w/o urine c/o's.  L arm sling applied. Informed by ED RN that pt passed ambulation trial.  Pt & daughter at bedside informed of all study results, expressed their understanding & agree with DC home, po Tylenol, topical ice packs, PCP & Orthopedic outpt f/u. 88 yo WF BIBA from home c/o anterior chest wall injury s/p slip in shower & fell forwards against into shower knob/switch at 10AM. Patient reports she was unable to get up on her own and remained on floor for hours. No LOC, head-strike. +pain mid chest and left shoulder. Mid chest with ecchymosis.   Clinical concern for trauma and possible rhabdomyolysis. Patient not a Trauma Alert candidate.   Plan EKG, XR chest, left shoulder/humerus, labs including coags, troponin, CPK, UA, IV fluids, CT head, c-spine, c/a/p, IV Tylenol for pain, fall precautions. Monitor, observe and reassess    23:00, CHRISTOPHER Oliver MD:  CTs + only for L clavicular head f x w/ associated m. hematoma.  C-collar removed, neck + AFROM w/o pain, NT.  Labs not actionable, U/A +contaminated sample, pt w/o urine c/o's.  L arm sling applied. Informed by ED RN that pt passed ambulation trial.  Pt in fair comfort; she & daughter at bedside informed of all study results, expressed their understanding & agree with DC home, po Tylenol, topical ice packs, PCP & Orthopedic outpt f/u.

## 2024-07-23 VITALS
RESPIRATION RATE: 16 BRPM | DIASTOLIC BLOOD PRESSURE: 89 MMHG | SYSTOLIC BLOOD PRESSURE: 133 MMHG | HEART RATE: 99 BPM | OXYGEN SATURATION: 95 % | TEMPERATURE: 99 F

## 2024-08-25 ENCOUNTER — INPATIENT (INPATIENT)
Facility: HOSPITAL | Age: 89
LOS: 2 days | Discharge: HOME | DRG: 280 | End: 2024-08-28
Attending: HOSPITALIST | Admitting: INTERNAL MEDICINE
Payer: MEDICARE

## 2024-08-25 VITALS
OXYGEN SATURATION: 93 % | HEART RATE: 96 BPM | TEMPERATURE: 99 F | SYSTOLIC BLOOD PRESSURE: 133 MMHG | WEIGHT: 179.46 LBS | RESPIRATION RATE: 16 BRPM | DIASTOLIC BLOOD PRESSURE: 80 MMHG

## 2024-08-25 DIAGNOSIS — H25.9 UNSPECIFIED AGE-RELATED CATARACT: Chronic | ICD-10-CM

## 2024-08-25 DIAGNOSIS — I21.4 NON-ST ELEVATION (NSTEMI) MYOCARDIAL INFARCTION: ICD-10-CM

## 2024-08-25 LAB
ADD ON TEST-SPECIMEN IN LAB: SIGNIFICANT CHANGE UP
ADD ON TEST-SPECIMEN IN LAB: SIGNIFICANT CHANGE UP
ALBUMIN SERPL ELPH-MCNC: 3.8 G/DL — SIGNIFICANT CHANGE UP (ref 3.3–5)
ALP SERPL-CCNC: 77 U/L — SIGNIFICANT CHANGE UP (ref 40–120)
ALT FLD-CCNC: 16 U/L — SIGNIFICANT CHANGE UP (ref 12–78)
ANION GAP SERPL CALC-SCNC: 10 MMOL/L — SIGNIFICANT CHANGE UP (ref 5–17)
ANISOCYTOSIS BLD QL: SLIGHT — SIGNIFICANT CHANGE UP
APTT BLD: 37.2 SEC — HIGH (ref 24.5–35.6)
AST SERPL-CCNC: 26 U/L — SIGNIFICANT CHANGE UP (ref 15–37)
BASOPHILS # BLD AUTO: 0.04 K/UL — SIGNIFICANT CHANGE UP (ref 0–0.2)
BASOPHILS NFR BLD AUTO: 0.3 % — SIGNIFICANT CHANGE UP (ref 0–2)
BILIRUB SERPL-MCNC: 1.4 MG/DL — HIGH (ref 0.2–1.2)
BUN SERPL-MCNC: 18 MG/DL — SIGNIFICANT CHANGE UP (ref 7–23)
CALCIUM SERPL-MCNC: 8.9 MG/DL — SIGNIFICANT CHANGE UP (ref 8.5–10.1)
CHLORIDE SERPL-SCNC: 107 MMOL/L — SIGNIFICANT CHANGE UP (ref 96–108)
CO2 SERPL-SCNC: 24 MMOL/L — SIGNIFICANT CHANGE UP (ref 22–31)
CREAT SERPL-MCNC: 1.14 MG/DL — SIGNIFICANT CHANGE UP (ref 0.5–1.3)
EGFR: 46 ML/MIN/1.73M2 — LOW
EOSINOPHIL # BLD AUTO: 0.01 K/UL — SIGNIFICANT CHANGE UP (ref 0–0.5)
EOSINOPHIL NFR BLD AUTO: 0.1 % — SIGNIFICANT CHANGE UP (ref 0–6)
FLUAV AG NPH QL: SIGNIFICANT CHANGE UP
FLUBV AG NPH QL: SIGNIFICANT CHANGE UP
GLUCOSE SERPL-MCNC: 140 MG/DL — HIGH (ref 70–99)
HCT VFR BLD CALC: 41.3 % — SIGNIFICANT CHANGE UP (ref 34.5–45)
HGB BLD-MCNC: 13.9 G/DL — SIGNIFICANT CHANGE UP (ref 11.5–15.5)
IMM GRANULOCYTES NFR BLD AUTO: 1.3 % — HIGH (ref 0–0.9)
INR BLD: 1.26 RATIO — HIGH (ref 0.85–1.18)
LYMPHOCYTES # BLD AUTO: 1.57 K/UL — SIGNIFICANT CHANGE UP (ref 1–3.3)
LYMPHOCYTES # BLD AUTO: 10.5 % — LOW (ref 13–44)
MAGNESIUM SERPL-MCNC: 1.8 MG/DL — SIGNIFICANT CHANGE UP (ref 1.6–2.6)
MANUAL SMEAR VERIFICATION: SIGNIFICANT CHANGE UP
MCHC RBC-ENTMCNC: 28.7 PG — SIGNIFICANT CHANGE UP (ref 27–34)
MCHC RBC-ENTMCNC: 33.7 GM/DL — SIGNIFICANT CHANGE UP (ref 32–36)
MCV RBC AUTO: 85.2 FL — SIGNIFICANT CHANGE UP (ref 80–100)
MICROCYTES BLD QL: SLIGHT — SIGNIFICANT CHANGE UP
MONOCYTES # BLD AUTO: 2.15 K/UL — HIGH (ref 0–0.9)
MONOCYTES NFR BLD AUTO: 14.4 % — HIGH (ref 2–14)
NEUTROPHILS # BLD AUTO: 11 K/UL — HIGH (ref 1.8–7.4)
NEUTROPHILS NFR BLD AUTO: 73.4 % — SIGNIFICANT CHANGE UP (ref 43–77)
OVALOCYTES BLD QL SMEAR: SLIGHT — SIGNIFICANT CHANGE UP
PLAT MORPH BLD: NORMAL — SIGNIFICANT CHANGE UP
PLATELET # BLD AUTO: 147 K/UL — LOW (ref 150–400)
POIKILOCYTOSIS BLD QL AUTO: SLIGHT — SIGNIFICANT CHANGE UP
POTASSIUM SERPL-MCNC: 3.4 MMOL/L — LOW (ref 3.5–5.3)
POTASSIUM SERPL-SCNC: 3.4 MMOL/L — LOW (ref 3.5–5.3)
PROT SERPL-MCNC: 6.9 GM/DL — SIGNIFICANT CHANGE UP (ref 6–8.3)
PROTHROM AB SERPL-ACNC: 14.1 SEC — HIGH (ref 9.5–13)
RBC # BLD: 4.85 M/UL — SIGNIFICANT CHANGE UP (ref 3.8–5.2)
RBC # FLD: 14.6 % — HIGH (ref 10.3–14.5)
RBC BLD AUTO: ABNORMAL
ROULEAUX BLD QL SMEAR: PRESENT
RSV RNA NPH QL NAA+NON-PROBE: SIGNIFICANT CHANGE UP
SARS-COV-2 RNA SPEC QL NAA+PROBE: SIGNIFICANT CHANGE UP
SODIUM SERPL-SCNC: 141 MMOL/L — SIGNIFICANT CHANGE UP (ref 135–145)
SPHEROCYTES BLD QL SMEAR: SLIGHT — SIGNIFICANT CHANGE UP
TROPONIN I, HIGH SENSITIVITY RESULT: 1709.75 NG/L — HIGH
WBC # BLD: 14.97 K/UL — HIGH (ref 3.8–10.5)
WBC # FLD AUTO: 14.97 K/UL — HIGH (ref 3.8–10.5)

## 2024-08-25 PROCEDURE — 93005 ELECTROCARDIOGRAM TRACING: CPT

## 2024-08-25 PROCEDURE — 97116 GAIT TRAINING THERAPY: CPT | Mod: GP

## 2024-08-25 PROCEDURE — C1887: CPT

## 2024-08-25 PROCEDURE — 97530 THERAPEUTIC ACTIVITIES: CPT | Mod: GP

## 2024-08-25 PROCEDURE — 84550 ASSAY OF BLOOD/URIC ACID: CPT

## 2024-08-25 PROCEDURE — 71045 X-RAY EXAM CHEST 1 VIEW: CPT | Mod: 26

## 2024-08-25 PROCEDURE — 99223 1ST HOSP IP/OBS HIGH 75: CPT

## 2024-08-25 PROCEDURE — 84100 ASSAY OF PHOSPHORUS: CPT

## 2024-08-25 PROCEDURE — 85027 COMPLETE CBC AUTOMATED: CPT

## 2024-08-25 PROCEDURE — 87086 URINE CULTURE/COLONY COUNT: CPT

## 2024-08-25 PROCEDURE — 99285 EMERGENCY DEPT VISIT HI MDM: CPT

## 2024-08-25 PROCEDURE — 84443 ASSAY THYROID STIM HORMONE: CPT

## 2024-08-25 PROCEDURE — 93306 TTE W/DOPPLER COMPLETE: CPT

## 2024-08-25 PROCEDURE — 83880 ASSAY OF NATRIURETIC PEPTIDE: CPT

## 2024-08-25 PROCEDURE — 97162 PT EVAL MOD COMPLEX 30 MIN: CPT | Mod: GP

## 2024-08-25 PROCEDURE — 87186 SC STD MICRODIL/AGAR DIL: CPT

## 2024-08-25 PROCEDURE — 84436 ASSAY OF TOTAL THYROXINE: CPT

## 2024-08-25 PROCEDURE — 36415 COLL VENOUS BLD VENIPUNCTURE: CPT

## 2024-08-25 PROCEDURE — C1760: CPT

## 2024-08-25 PROCEDURE — C1769: CPT

## 2024-08-25 PROCEDURE — 81001 URINALYSIS AUTO W/SCOPE: CPT

## 2024-08-25 PROCEDURE — 83036 HEMOGLOBIN GLYCOSYLATED A1C: CPT

## 2024-08-25 PROCEDURE — 93454 CORONARY ARTERY ANGIO S&I: CPT

## 2024-08-25 PROCEDURE — 93010 ELECTROCARDIOGRAM REPORT: CPT | Mod: 76

## 2024-08-25 PROCEDURE — 84484 ASSAY OF TROPONIN QUANT: CPT

## 2024-08-25 PROCEDURE — 83735 ASSAY OF MAGNESIUM: CPT

## 2024-08-25 PROCEDURE — 74177 CT ABD & PELVIS W/CONTRAST: CPT | Mod: 26,MC

## 2024-08-25 PROCEDURE — 80053 COMPREHEN METABOLIC PANEL: CPT

## 2024-08-25 PROCEDURE — 85025 COMPLETE CBC W/AUTO DIFF WBC: CPT

## 2024-08-25 PROCEDURE — C1894: CPT

## 2024-08-25 PROCEDURE — 80048 BASIC METABOLIC PNL TOTAL CA: CPT

## 2024-08-25 RX ORDER — LATANOPROST 50 UG/ML
1 SOLUTION OPHTHALMIC
Refills: 0 | DISCHARGE

## 2024-08-25 RX ORDER — DORZOLAMIDE HCL 2 %
1 DROPS OPHTHALMIC (EYE)
Refills: 0 | DISCHARGE

## 2024-08-25 RX ORDER — TAMSULOSIN HYDROCHLORIDE 0.4 MG/1
0.4 CAPSULE ORAL AT BEDTIME
Refills: 0 | Status: DISCONTINUED | OUTPATIENT
Start: 2024-08-25 | End: 2024-08-28

## 2024-08-25 RX ORDER — ACETAMINOPHEN 325 MG/1
1000 TABLET ORAL ONCE
Refills: 0 | Status: COMPLETED | OUTPATIENT
Start: 2024-08-25 | End: 2024-08-25

## 2024-08-25 RX ORDER — SODIUM CHLORIDE 9 MG/ML
500 INJECTION INTRAMUSCULAR; INTRAVENOUS; SUBCUTANEOUS
Refills: 0 | Status: COMPLETED | OUTPATIENT
Start: 2024-08-25 | End: 2024-08-26

## 2024-08-25 RX ORDER — ENOXAPARIN SODIUM 100 MG/ML
40 INJECTION SUBCUTANEOUS ONCE
Refills: 0 | Status: COMPLETED | OUTPATIENT
Start: 2024-08-25 | End: 2024-08-25

## 2024-08-25 RX ORDER — LATANOPROST 50 UG/ML
1 SOLUTION OPHTHALMIC AT BEDTIME
Refills: 0 | Status: DISCONTINUED | OUTPATIENT
Start: 2024-08-25 | End: 2024-08-28

## 2024-08-25 RX ORDER — SENNA 187 MG
2 TABLET ORAL AT BEDTIME
Refills: 0 | Status: DISCONTINUED | OUTPATIENT
Start: 2024-08-25 | End: 2024-08-28

## 2024-08-25 RX ORDER — ASPIRIN 81 MG
81 TABLET, DELAYED RELEASE (ENTERIC COATED) ORAL DAILY
Refills: 0 | Status: DISCONTINUED | OUTPATIENT
Start: 2024-08-25 | End: 2024-08-28

## 2024-08-25 RX ORDER — CALCIUM CARBONATE 500(1250)
3 TABLET ORAL EVERY 6 HOURS
Refills: 0 | Status: DISCONTINUED | OUTPATIENT
Start: 2024-08-25 | End: 2024-08-28

## 2024-08-25 RX ORDER — ENOXAPARIN SODIUM 100 MG/ML
40 INJECTION SUBCUTANEOUS ONCE
Refills: 0 | Status: COMPLETED | OUTPATIENT
Start: 2024-08-26 | End: 2024-08-26

## 2024-08-25 RX ORDER — SODIUM CHLORIDE 9 MG/ML
500 INJECTION INTRAMUSCULAR; INTRAVENOUS; SUBCUTANEOUS ONCE
Refills: 0 | Status: COMPLETED | OUTPATIENT
Start: 2024-08-25 | End: 2024-08-25

## 2024-08-25 RX ORDER — ASPIRIN 81 MG
324 TABLET, DELAYED RELEASE (ENTERIC COATED) ORAL ONCE
Refills: 0 | Status: COMPLETED | OUTPATIENT
Start: 2024-08-25 | End: 2024-08-25

## 2024-08-25 RX ORDER — DORZOLAMIDE HCL 2 %
1 DROPS OPHTHALMIC (EYE)
Refills: 0 | Status: DISCONTINUED | OUTPATIENT
Start: 2024-08-25 | End: 2024-08-28

## 2024-08-25 RX ADMIN — ENOXAPARIN SODIUM 40 MILLIGRAM(S): 100 INJECTION SUBCUTANEOUS at 19:26

## 2024-08-25 RX ADMIN — ACETAMINOPHEN 1000 MILLIGRAM(S): 325 TABLET ORAL at 19:20

## 2024-08-25 RX ADMIN — SODIUM CHLORIDE 500 MILLILITER(S): 9 INJECTION INTRAMUSCULAR; INTRAVENOUS; SUBCUTANEOUS at 18:31

## 2024-08-25 RX ADMIN — ACETAMINOPHEN 400 MILLIGRAM(S): 325 TABLET ORAL at 16:12

## 2024-08-25 RX ADMIN — Medication 300 MILLIGRAM(S): at 17:44

## 2024-08-25 RX ADMIN — Medication 324 MILLIGRAM(S): at 17:42

## 2024-08-25 NOTE — ED PROVIDER NOTE - OBJECTIVE STATEMENT
Subjective:  - Summary : The patient is an 89-year-old female with a history of anxiety and osteoarthritis. She has been experiencing pain in the left lower quadrant which has been ongoing for about a month. The pain seems to have started after a fall in the shower about a month ago, during which she fractured her clavicle. More recently, the pain seems to be moving around to the front and she has experienced nausea and vomiting. Notably, she denies having any fever, bowel or urination issues.  - Chief Complaint (CC) : Left Lower Quadrant Pain  - History of Present Illness (HPI) : The patient has been experiencing left lower quadrant pain for about a month. This started after a fall in the shower, post which she has also been experiencing back pain and has fractured her clavicle. The pain seems to have moved to the front and she has been experiencing bouts of nausea. On further probing, she revealed vomiting recently.

## 2024-08-25 NOTE — ED PROVIDER NOTE - CARE PLAN
1 Principal Discharge DX:	Abdominal pain   Principal Discharge DX:	Abdominal pain  Secondary Diagnosis:	Renal colic   Principal Discharge DX:	NSTEMI (non-ST elevation myocardial infarction)  Secondary Diagnosis:	Renal colic  Secondary Diagnosis:	Abdominal pain

## 2024-08-25 NOTE — ED ADULT NURSE NOTE - NSFALLHARMRISKINTERV_ED_ALL_ED

## 2024-08-25 NOTE — H&P ADULT - NSHPLABSRESULTS_GEN_ALL_CORE
FINDINGS:  LOWER CHEST: Trace bilateral pleural effusions, new    LIVER: Within normal limits.  BILE DUCTS: Normal caliber.  GALLBLADDER: Within normal limits.  SPLEEN: Within normal limits.  PANCREAS: Within normal limits.  ADRENALS: Within normal limits.  KIDNEYS/URETERS: Normal right kidney and collecting system. New moderate   to severe left hydronephrosis and moderate left hydroureter secondary to   a partially obstructing approximately 2 mm calculus located at the left   ureterovesicular junction (2:103).    BLADDER: As per above, calculus at the left UVJ  REPRODUCTIVE ORGANS: Within normal limits    BOWEL: No bowel obstruction.  PERITONEUM/RETROPERITONEUM: Within normal limits.  VESSELS: Within normal limits.  LYMPH NODES: No lymphadenopathy.  ABDOMINAL WALL: Within normal limits.  BONES: Degenerative changes.    IMPRESSION:  Partially obstructing 2 mm left ureterovesicular calculus with resultant   mild to moderate left hydroureter and moderate to severe left   hydronephrosis.

## 2024-08-25 NOTE — H&P ADULT - ASSESSMENT
Loc Rodriguez. : 1976  [facility]  Secondary:  [address] [address]  Phone: [phone]  Fax: [phone] podiatrist today  Initial:}    2/10Post Session:     2   /10  Medications Last Reviewed:  9/15/Betha Kawasaki, PT [facility] SF   9/22/2022  1:00 PM Betha Kawasaki, PT [facility] SFD   9/27/2022 10:15 AM Oleg Brown MD [facility] AMB   9/27/2022  1:00 PM Betha Kawasaki, PT [facility] SFD   9/29/2022  1:00 PM Betha Kawasaki, PT [facility]   12/22/2022 10:30 AM Nitza Hanks MD [facility] AMB   8/22/2023 11:30 AM Nitza Hanks MD [facility] AMB 89 year old female w anxiety, glaucoma and osteoarthritis came to ED w granddaughter c/o LLQ pain       #NSTEMI  trop 1700  has had fatigue and ROWE  EKG SR w T wave inversion  #Fatigue  #Prolonged QT  1. admit to telemetry  2. serial trop  3. BNP  4.  mg in ED then asa 81 mg  5. plavix 300 mg then 75 mg qd  6/ lovenox 40 mg in ED, repeat x 1  7. ECHO  8. CARD consult  9. statin  10 check lipids, Hb A1c  11. check K, Mg, P  k>4, Mg > 2, P >3  12. TSH  13. PT consult      #Obstructing UV jx stone w hydronephrosis    #Back and abd pain : intermittent  1. ceftriaxone  2.  cc as 100 cc/hr  3. flomax 0.4  4. strain urine  5.  consult      #Clavicle fx  healing as per pt and family after ORTHO visit        #Glaucoma : both eyes  1. latanoprost q HS  2. dorolamide BID      #Macular degeneration  for monthly injections        #VTE  on lovenox      #80 minutes   89 year old female w anxiety, glaucoma and osteoarthritis came to ED w granddaughter c/o LLQ pain       #NSTEMI  trop 1700  has had fatigue and ROWE  EKG SR w T wave inversion  #Fatigue  #Prolonged QT  1. admit to telemetry  2. serial trop  3. BNP  4.  mg in ED then asa 81 mg  5. plavix 300 mg then 75 mg qd  6/ lovenox 40 mg in ED, repeat x 1  7. ECHO  8. CARD consult  9. statin  10 check lipids, Hb A1c  11. check K, Mg, P  k>4, Mg > 2, P >3  12. TSH  13. PT consult      #Hypokalemia  3.4  1. KCL 10 meq IV x 3  2. repeat in AM      #Hypomag  1.8  1. Mg 1 G IV  2. repeat in AM        #Obstructing UV jx stone w hydronephrosis  #Back and abd pain : intermittent  #UTI  1. ceftriaxone  2.  cc as 100 cc/hr  3. flomax 0.4  4. strain urine  5.  consult      #Clavicle fx  healing as per pt and family after ORTHO visit        #Glaucoma : both eyes  1. latanoprost q HS  2. dorolamide BID      #Macular degeneration  for monthly injections        #VTE  on lovenox      #80 minutes

## 2024-08-25 NOTE — ED PROVIDER NOTE - PROGRESS NOTE DETAILS
CT with left renal stone - likely etiology of pain. Patient updated.   Awaiting urine results Sulaiman, DO: Patient signed out to me by Dr. Apodaca. Patient is here with left-sided flank/chest pain. Plan of care is to follow-up labs. Disposition is pending reassessment.  Patient noted to have inferolateral EKG T wave inversions.  A troponin was added by Dr. Apodaca.  Troponin just resulted at 1700.  Patient denies active chest pain but daughter notes patient has been having dyspnea on exertion as of recently.  Awaiting urinalysis as patient is still not produced urine.  CT showing a 2 mm left UVJ stone.  Spoke with Dr. Restrepo on-call for PCI, no indication for stat catheterization, recommends DAPT and Lovenox if concern for hematuria.  I, Dakota Hilario, personally discussed this patient's care with Dr. Shin attending hospitalist who recommends admit to telemetry.

## 2024-08-25 NOTE — ED PROVIDER NOTE - WR ORDER DATE AND TIME 1
Cardiology Daily Note Peggy Cullen MD      Patient:  Coby Guzman  [id number]    Patient Active Problem List    Diagnosis Date Noted    Hemiplegia of left nondominant side as late effect of cerebrovascular disease ([address] 05/29/2021    Hypertension 05/29/2021    CAD (coronary artery disease) 05/29/2021    History of heart artery stent 05/29/2021    COPD (chronic obstructive pulmonary disease) ([address] 05/29/2021    Dyspnea 05/29/2021    Shortness of breath 05/29/2021    Falls frequently 05/29/2021    Gait instability 05/29/2021    Hyperlipidemia associated with type 2 diabetes mellitus ([address] 05/29/2021    Morbid obesity ([address] 05/29/2021    CKD (chronic kidney disease) stage 3, GFR 30-59 ml/min 05/29/2021    Narcotic habituation, continuous ([address] 05/29/2021    Edentulous 05/29/2021    CVA (cerebral vascular accident) ([address] 05/29/2021    Patent foramen ovale with atrial septal aneurysm 05/29/2021    Left hemiplegia ([address] 05/29/2021    Nephropathy 05/29/2021    Neuropathy 05/29/2021       Admit Date:  5/29/2021    Admission Problem List: Present on Admission:   Hemiplegia of left nondominant side as late effect of cerebrovascular disease ([address]   Hypertension   CAD (coronary artery disease)   History of heart artery stent   COPD (chronic obstructive pulmonary disease) ([address]   Dyspnea   Shortness of breath   Falls frequently   Gait instability   Hyperlipidemia associated with type 2 diabetes mellitus (HCC)   Morbid obesity (HCC)   CKD (chronic kidney disease) stage 3, GFR 30-59 ml/min   Narcotic habituation, continuous ([address]   Edentulous   CVA (cerebral vascular accident) ([address]   Patent foramen ovale with atrial septal aneurysm   Left hemiplegia ([address]   Nephropathy   Neuropathy      Cardiac Specific Data:  Specialty Problems        Cardiology Problems    CAD (coronary artery disease)        CVA (cerebral vascular accident) ([address]        Hypertension        Patent foramen ovale with palpable pulses; Hematoma: No  Neuro: Intact neurovascular function Yes    Lab Data:  CBC:   Recent Labs     05/29/21  0522 05/29/21  1318 05/30/21 0312 05/31/21  0128   WBC 14.3*  --  15.7* 16.1*   HGB 14.1  --  13.6 13.0   HCT 43.3 42.2 42.2 40.6   MCV 95.6  --  96.8 96.2    376 382 356     BMP:   Recent Labs     05/29/21  0522 05/30/21  0312 05/31/21  0128    139 138   K 3.7 4.0 4.1    104 104   CO2 26 22 23   BUN 13 13 17   CREATININE 1.4* 1.4* 1.5*     LIVER PROFILE:   Recent Labs     05/29/21 0522   AST 10   ALT 6   BILITOT 0.3   ALKPHOS 137*     PT/INR:   Recent Labs     05/29/21 0522   PROTIME 13.8   INR 1.06     APTT: No results for input(s): APTT in the last 72 hours. BNP:  No results for input(s): BNP in the last 72 hours. CK, CKMB, Troponin: @LABRCNT (CKTOTAL:3, CKMB:3, TROPONINI:3)@    IMAGING:  XR CHEST PORTABLE    Result Date: 5/30/2021  Exam: XR CHEST PORTABLE - 5/30/2021 10:34 AM Indication: Leukocytosis Comparison: None available. Findings: Cardiomediastinal silhouette is within normal limits. No pleural effusion, pneumothorax, or focal consolidation. No acute osseous findings. Surgical clips in the LEFT upper abdomen. No acute findings. Signed by Dr Benoit Kirkpatrick on 5/30/2021 11:54 AM    VL DUP CAROTID BILATERAL    Result Date: 5/31/2021  Vascular Carotid Procedure  Demographics   Patient Name    Zoey Henry Age                   61   Patient Number  [id number]       Gender                Female   Visit Number    [id number]    Interpreting          Reena Angelo MD                               Physician   Date of Birth   1957   Referring Physician   Gallito Lee   Accession       [id number]   [address], [place], [address]  Number  Procedure Type of Study:   Cerebral:Carotid, VL CAROTID BILATERAL. Indications for Study:Stroke.  Risk Factors   - The patient's risk factor(s) include: diabetes mellitus and arterial     hypertension.   - The patient has a former tobacco history. Impression   Duplex sonography with color flow enhancement was performed bilaterally on  the cervical carotid system. No evidence of hemodynamically significant  stenosis in the bilateral carotid and vertebral arteries. Signature   ----------------------------------------------------------------  Electronically signed by Radha Lee MD(Interpreting  physician) on 05/31/2021 06:32 AM  ----------------------------------------------------------------  Blood Pressure:Right arm 136/82 mmHg. Left arm 134/80 mmHg. Velocities are measured in cm/s ; Diameters are measured in mm Carotid Right Measurements +------------+-------+-------+--------+-------+------------+---------------+ ! Location    ! PSV    ! EDV    ! Angle   ! RI     !%Stenosis   ! Tortuosity     ! +------------+-------+-------+--------+-------+------------+---------------+ ! Prox CCA    !88     !27.5   !60      !0.69   !            !               ! +------------+-------+-------+--------+-------+------------+---------------+ ! Mid CCA     !77     !25.1   ! 60      !0.67   !            !               ! +------------+-------+-------+--------+-------+------------+---------------+ ! Prox ICA    !63.6   !23.6   ! 60      !0.63   !            !               ! +------------+-------+-------+--------+-------+------------+---------------+ ! Mid ICA     !72.3   !30.6   ! 60      !0.58   !            !               ! +------------+-------+-------+--------+-------+------------+---------------+ ! Dist ICA    !83.3   !25.9   !60      !0.69   !            !               ! +------------+-------+-------+--------+-------+------------+---------------+ ! Prox ECA    !67.6   !14.9   !60      !0.78   !            !               ! +------------+-------+-------+--------+-------+------------+---------------+ ! Vertebral   !65.3   !19.7   !56      !0.7    !             !               ! +------------+-------+-------+--------+-------+------------+---------------+ - There is antegrade vertebral flow noted on the right side. - Additional Measurements:ICAPSV/CCAPSV 0.95. ICAEDV/CCAEDV 1.11. Carotid Left Measurements +------------+-------+-------+--------+-------+------------+---------------+ ! Location    ! PSV    ! EDV    ! Angle   ! RI     !%Stenosis   ! Tortuosity     ! +------------+-------+-------+--------+-------+------------+---------------+ ! Prox CCA    !109    !26.7   ! 60      !0.76   !            !               ! +------------+-------+-------+--------+-------+------------+---------------+ ! Mid CCA     !77     !25.9   !60      !0.66   !            !               ! +------------+-------+-------+--------+-------+------------+---------------+ ! Prox ICA    !53.4   !20.8   !60      !0.61   !            !               ! +------------+-------+-------+--------+-------+------------+---------------+ ! Mid ICA     ! 85     !27.6   ! 60      !0.68   !            !               ! +------------+-------+-------+--------+-------+------------+---------------+ ! Dist ICA    !56.3   !23.5   !60      !0.58   !            !               ! +------------+-------+-------+--------+-------+------------+---------------+ ! Prox ECA    !90.4   !18.9   !60      !0.79   !            !               ! +------------+-------+-------+--------+-------+------------+---------------+ ! Vertebral   !46     !17.7   ! 60      !0.62   !            !               ! +------------+-------+-------+--------+-------+------------+---------------+   - There is antegrade vertebral flow noted on the left side. - Additional Measurements:ICAPSV/CCAPSV 0.78. ICAEDV/CCAEDV 1.03. MRI BRAIN WO CONTRAST    Result Date: 5/29/2021  Exam: MRI BRAIN WO CONTRAST - 5/29/2021 11:31 AM Indication: Stroke like symptoms Comparison: None available. Findings: Small acute infarcts in the RIGHT periventricular white matter and RIGHT putamen.  There is some matching T2 FLAIR hyperintense signal. No increased susceptibility to suggest acute or chronic hemorrhage. Major physiologic flow voids are maintained. RIGHT frontal, LEFT parietal, and RIGHT occipital areas of encephalomalacia likely representing old infarcts. Chronic microvascular ischemic white matter change. Global cerebral volume loss. No midline shift or mass effect. Lateral ventricles are nondilated. Basilar cisterns are patent. Sella turcica and its contents appear unremarkable. No acute orbital finding. Mastoid air cells and paranasal sinuses are clear. 1.  Small acute infarcts in the RIGHT periventricular white matter and RIGHT basal ganglia. 2.  Multiple bilateral remote infarcts. Signed by Dr Edin Lang on 5/29/2021 2:43 PM    ECHO 2D WO COLOR DOPPLER COMPLETE    Result Date: 5/29/2021  Transthoracic Echocardiography Report (TTE)  Demographics   Patient Name  Wilmar Gao Date of Study         05/29/2021   MRN           [id number]       Gender                Female   Date of Birth 1957   Room Number           [id number]-1606   Age           61 year(s)   Height:       65 inches    Referring Physician   Natasha Renteria MD   Weight:       220 pounds   Sonographer           Loly Beck RDCS   BSA:          2.06 m^2     Interpreting          Atlee Rad MD                             Physician   BMI:          36.61 kg/m^2  Procedure Type of Study   TTE procedure:ECHOCARDIOGRAM COMPLETE 2D WO COLOR DOPPLER. Study Location: Echo Lab Technical Quality: Adequate visualization Patient Status: Inpatient Contrast Medium: Bubble Study. Indications:Patent foramen ovale (PFO). Conclusions   Summary  Normal size left atrium. positive bubble study  Normal right atrial dimension with no evidence of thrombus or mass noted. Normal right ventricular size with preserved RV function. Normal right ventricular size with preserved RV function. pfo present  Normal right ventricular size with preserved RV function. pfo present. large shunt. aneurysmal ias.   No evidence of significant pericardial effusion is noted. No evidence of pleural effusion. Structurally normal mitral valve with normal leaflet mobility. No evidence  of mitral valve stenosis or mild mitral regurgitation. Aortic valve appears to be tricuspid. Structurally normal aortic valve. No significant aortic regurgitation or stenosis is noted. Tricuspid valve is structurally normal.  No evidence of tricuspid regurgitation. Signature   ----------------------------------------------------------------  Electronically signed by Efrem Mcgee MD(Interpreting  physician) on 05/29/2021 03:38 PM  ----------------------------------------------------------------   Findings   Mitral Valve  Structurally normal mitral valve with normal leaflet mobility. No evidence  of mitral valve stenosis or mild mitral regurgitation. Aortic Valve  Aortic valve appears to be tricuspid. Structurally normal aortic valve. No significant aortic regurgitation or stenosis is noted. Tricuspid Valve  Tricuspid valve is structurally normal.  No evidence of tricuspid regurgitation. Left Atrium  Normal size left atrium. positive bubble study   Left Ventricle  Normal left ventricular size with preserved LV function and an estimated  ejection fraction of approximately 55-60%. No evidence of left ventricular mass or thrombus noted. Right Atrium  Normal right atrial dimension with no evidence of thrombus or mass noted. Right Ventricle  Normal right ventricular size with preserved RV function. pfo present. large shunt. aneurysmal ias. Pericardial Effusion  No evidence of significant pericardial effusion is noted. Pleural Effusion  No evidence of pleural effusion.   M-Mode Measurements (cm)   LVIDd: 4.45 cm                       LVIDs: 3.29 cm  IVSd: 1.01 cm  LVPWd: 1.02 cm                       AO Root Dimension: 3.1 cm  % Ejection Fraction: 51 %            LA: 3 cm                                       LVOT: 1.9 cm  Doppler Measurements:   AV Peak Velocity:113 cm/s           MV Peak E-Wave: 50.4 cm/s  AV Peak Gradient: 5.11 mmHg         MV Peak A-Wave: 61.4 cm/s                                      MV E/A Ratio: 0.82 %                                      MV Peak Gradient: 1.02 mmHg          Assessment:  1. Recurrent stroke  2. DM  3. HTN    C/c renal insuffiency  4.  COPD        Recommendations:     Pt needs op monitor to r/o a fib  Will get Dr Levy Stern for PFO closure  Change plavix to Murtis Rodriguez give recurrent stroke an d PFo  HTN control    Nancy Allen MD, MD 5/31/2021 8:50 AM 25-Aug-2024 15:39

## 2024-08-25 NOTE — ED ADULT TRIAGE NOTE - CHIEF COMPLAINT QUOTE
BIBA for LLQ abd pain, pt reports some nausea "a few days ago." Pt lives at home w/daughter who states pt is baseline mentation.

## 2024-08-25 NOTE — PATIENT PROFILE ADULT - FALL HARM RISK - HARM RISK INTERVENTIONS

## 2024-08-25 NOTE — ED PROVIDER NOTE - NSFOLLOWUPINSTRUCTIONS_ED_ALL_ED_FT
Follow-up with your regular physician  Return with any persistent/worsening symptoms.     Abdominal Pain    Many things can cause abdominal pain. Many times, abdominal pain is not caused by a disease and will improve without treatment. Your health care provider will do a physical exam to determine if there is a dangerous cause of your pain; blood tests and imaging may help determine the cause of your pain. However, in many cases, no cause may be found and you may need further testing as an outpatient. Monitor your abdominal pain for any changes.     SEEK IMMEDIATE MEDICAL CARE IF YOU HAVE ANY OF THE FOLLOWING SYMPTOMS: worsening abdominal pain, uncontrollable vomiting, profuse diarrhea, inability to have bowel movements or pass gas, black or bloody stools, fever accompanying chest pain or back pain, or fainting. These symptoms may represent a serious problem that is an emergency. Do not wait to see if the symptoms will go away. Get medical help right away. Call 911 and do not drive yourself to the hospital. Follow-up with your regular physician  Return with any persistent/worsening symptoms.     Renal Colic    Renal colic is pain that is caused by a kidney stone.     HOME CARE  Take medicines only as told by your doctor.  Ask your doctor if it is okay to take over-the-counter medicine for pain.  Drink enough fluid to keep your pee (urine) clear or pale yellow. Drink 6–8 glasses of water each day.  Eat less than 2 grams of salt per day.  Eat less protein. Some foods that have protein are meats, fish, nuts, and dairy.  Try not to eat spinach, rhubarb, nuts, or bran.    GET HELP IF:  You have a fever or chills.  Your pee smells bad or looks cloudy.  You have pain or burning when you pee.    GET HELP RIGHT AWAY IF:  The pain in your side (flank) or your groin suddenly gets worse.  You get confused.  You pass out.    ADDITIONAL NOTES AND INSTRUCTIONS    Please follow up with your Primary MD in 24-48 hr.  Seek immediate medical care for any new/worsening signs or symptoms.

## 2024-08-25 NOTE — ED ADULT NURSE REASSESSMENT NOTE - NS ED NURSE REASSESS COMMENT FT1
Patient resting in ED stretcher. Family at bedside. Patient in NAD; A+Ox4. Denies pain, sob, n/v. 1L NC currently 95% O2. VSS. NSR on cardiac monitor. Patient profile completed. Patient and family made aware of current plan to admit. Awaiting bed assignment and further orders. Care continues as ordered. Call bell within reach. Comfort measures provided.

## 2024-08-25 NOTE — H&P ADULT - HISTORY OF PRESENT ILLNESS
89 year old female w anxiety and osteoarthritis    +left lower quadrant x 1 month. The pain seems to have started after a fall in the shower about a month ago, during which she fractured her clavicle. More recently, the pain seems to be moving around to the front and she has experienced nausea and vomiting. Notably, she denies having any fever, bowel or urination issues.      In ED /80   HR 96   RR 16   T 98.7   93% sat RA  trop discussed w Dr. Restrepo; ASA, plavix, lovenox          PAST MEDICAL HX  Anxiety   Cataract   Fall w L clavicle fx   H/O seasonal allergies   OA (osteoarthritis) knees  Occasional tremors    PAST SURGICAL HX  Age-related cataract of both eyes, R , L  89 year old female w anxiety, glaucoma and osteoarthritis came to ED w granddaughter c/o LLQ pain     +left lower quadrant x intermittent 1 month. The pain seems to have started after a fall in the shower about a month ago when she fractured her L clavicle   Recently pain seems to be moving around to the front LLQ, occasionally L back; grades pain as 7/10; not sure what increases or decreases pain  + nausea and vomiting the other night   denied fever, denied change in bowel or urination  +fatigue  +decreased appetite  Granddaughter aware that pt has been wheezing fr a couple of weeks  Saw ORTHO in follow up and is healing nicely w clavicle fx      In ED /80   HR 96   RR 16   T 98.7   93% sat RA  T wave inversions, trop 1700  trop discussed w Dr. Restrepo; ASA, plavix, lovenox  CT A/P for LLQ pain + 2mm obstructing UV jx stone w hydro    Being admitted for NSTEMI and obstructing ureteral stone      PAST MEDICAL HX  Anxiety   Cataract   Fall w L clavicle fx   Glaucoma  H/O seasonal allergies  Macular degeneration gets injections monthly   OA (osteoarthritis) knees  Occasional tremors      PAST SURGICAL HX  Age-related cataract of both eyes, R , L       FAMILY Hx  heart disease : Maternal Grandmother, Son who  age 61 w LBBB presumed MI      SOCIAL HX  lives alone  never smoker  started walking w a cane on her own

## 2024-08-25 NOTE — H&P ADULT - NSHPPHYSICALEXAM_GEN_ALL_CORE
Vital Signs Last 24 Hrs  T(C): 37.1 (25 Aug 2024 19:18), Max: 37.1 (25 Aug 2024 14:27)  T(F): 98.8 (25 Aug 2024 19:18), Max: 98.8 (25 Aug 2024 19:18)  HR: 86 (25 Aug 2024 19:18) (86 - 98)  BP: 127/76 (25 Aug 2024 19:18) (127/76 - 133/80)  BP(mean): 93 (25 Aug 2024 19:18) (93 - 93)  RR: 17 (25 Aug 2024 19:18) (16 - 17)  SpO2: 97% (25 Aug 2024 19:18) (93% - 97%)    Parameters below as of 25 Aug 2024 19:18  Patient On (Oxygen Delivery Method): nasal cannula  O2 Flow (L/min): 1

## 2024-08-26 ENCOUNTER — RESULT REVIEW (OUTPATIENT)
Age: 89
End: 2024-08-26

## 2024-08-26 LAB
A1C WITH ESTIMATED AVERAGE GLUCOSE RESULT: 5.2 % — SIGNIFICANT CHANGE UP (ref 4–5.6)
ANION GAP SERPL CALC-SCNC: 8 MMOL/L — SIGNIFICANT CHANGE UP (ref 5–17)
APPEARANCE UR: CLEAR — SIGNIFICANT CHANGE UP
BACTERIA # UR AUTO: NEGATIVE /HPF — SIGNIFICANT CHANGE UP
BILIRUB UR-MCNC: NEGATIVE — SIGNIFICANT CHANGE UP
BUN SERPL-MCNC: 17 MG/DL — SIGNIFICANT CHANGE UP (ref 7–23)
CALCIUM SERPL-MCNC: 8.4 MG/DL — LOW (ref 8.5–10.1)
CAST: 0 /LPF — SIGNIFICANT CHANGE UP (ref 0–4)
CHLORIDE SERPL-SCNC: 112 MMOL/L — HIGH (ref 96–108)
CO2 SERPL-SCNC: 21 MMOL/L — LOW (ref 22–31)
COLOR SPEC: SIGNIFICANT CHANGE UP
CREAT SERPL-MCNC: 0.8 MG/DL — SIGNIFICANT CHANGE UP (ref 0.5–1.3)
DIFF PNL FLD: ABNORMAL
EGFR: 70 ML/MIN/1.73M2 — SIGNIFICANT CHANGE UP
ESTIMATED AVERAGE GLUCOSE: 103 MG/DL — SIGNIFICANT CHANGE UP (ref 68–114)
GLUCOSE SERPL-MCNC: 111 MG/DL — HIGH (ref 70–99)
GLUCOSE UR QL: NEGATIVE MG/DL — SIGNIFICANT CHANGE UP
HCT VFR BLD CALC: 39.2 % — SIGNIFICANT CHANGE UP (ref 34.5–45)
HGB BLD-MCNC: 13 G/DL — SIGNIFICANT CHANGE UP (ref 11.5–15.5)
KETONES UR-MCNC: 40 MG/DL
LEUKOCYTE ESTERASE UR-ACNC: NEGATIVE — SIGNIFICANT CHANGE UP
MAGNESIUM SERPL-MCNC: 2.3 MG/DL — SIGNIFICANT CHANGE UP (ref 1.6–2.6)
MCHC RBC-ENTMCNC: 28.8 PG — SIGNIFICANT CHANGE UP (ref 27–34)
MCHC RBC-ENTMCNC: 33.2 GM/DL — SIGNIFICANT CHANGE UP (ref 32–36)
MCV RBC AUTO: 86.7 FL — SIGNIFICANT CHANGE UP (ref 80–100)
NITRITE UR-MCNC: NEGATIVE — SIGNIFICANT CHANGE UP
NT-PROBNP SERPL-SCNC: HIGH PG/ML (ref 0–450)
PH UR: 5 — SIGNIFICANT CHANGE UP (ref 5–8)
PHOSPHATE SERPL-MCNC: 2.6 MG/DL — SIGNIFICANT CHANGE UP (ref 2.5–4.5)
PLATELET # BLD AUTO: 138 K/UL — LOW (ref 150–400)
POTASSIUM SERPL-MCNC: 3.6 MMOL/L — SIGNIFICANT CHANGE UP (ref 3.5–5.3)
POTASSIUM SERPL-SCNC: 3.6 MMOL/L — SIGNIFICANT CHANGE UP (ref 3.5–5.3)
PROT UR-MCNC: 30 MG/DL
RBC # BLD: 4.52 M/UL — SIGNIFICANT CHANGE UP (ref 3.8–5.2)
RBC # FLD: 14.9 % — HIGH (ref 10.3–14.5)
RBC CASTS # UR COMP ASSIST: 2 /HPF — SIGNIFICANT CHANGE UP (ref 0–4)
SODIUM SERPL-SCNC: 141 MMOL/L — SIGNIFICANT CHANGE UP (ref 135–145)
SP GR SPEC: >=1.099 (ref 1–1.03)
SQUAMOUS # UR AUTO: 8 /HPF — HIGH (ref 0–5)
TROPONIN I, HIGH SENSITIVITY RESULT: 1051.97 NG/L — HIGH
TSH SERPL-MCNC: 7.16 UU/ML — HIGH (ref 0.34–4.82)
URATE SERPL-MCNC: 5 MG/DL — SIGNIFICANT CHANGE UP (ref 2.5–7)
UROBILINOGEN FLD QL: 1 MG/DL — SIGNIFICANT CHANGE UP (ref 0.2–1)
WBC # BLD: 11.8 K/UL — HIGH (ref 3.8–10.5)
WBC # FLD AUTO: 11.8 K/UL — HIGH (ref 3.8–10.5)
WBC UR QL: 16 /HPF — HIGH (ref 0–5)

## 2024-08-26 PROCEDURE — 99233 SBSQ HOSP IP/OBS HIGH 50: CPT

## 2024-08-26 PROCEDURE — 93010 ELECTROCARDIOGRAM REPORT: CPT

## 2024-08-26 PROCEDURE — 93306 TTE W/DOPPLER COMPLETE: CPT | Mod: 26

## 2024-08-26 RX ORDER — POTASSIUM CHLORIDE 10 MEQ
10 TABLET, EXT RELEASE, PARTICLES/CRYSTALS ORAL
Refills: 0 | Status: COMPLETED | OUTPATIENT
Start: 2024-08-26 | End: 2024-08-26

## 2024-08-26 RX ORDER — MAGNESIUM, ALUMINUM HYDROXIDE 200-225/5
30 SUSPENSION, ORAL (FINAL DOSE FORM) ORAL EVERY 4 HOURS
Refills: 0 | Status: DISCONTINUED | OUTPATIENT
Start: 2024-08-26 | End: 2024-08-28

## 2024-08-26 RX ORDER — ACETAMINOPHEN 325 MG/1
650 TABLET ORAL EVERY 6 HOURS
Refills: 0 | Status: DISCONTINUED | OUTPATIENT
Start: 2024-08-26 | End: 2024-08-28

## 2024-08-26 RX ORDER — METOPROLOL TARTRATE 100 MG/1
25 TABLET ORAL
Refills: 0 | Status: COMPLETED | OUTPATIENT
Start: 2024-08-26 | End: 2024-08-27

## 2024-08-26 RX ADMIN — Medication 1 DROP(S): at 21:16

## 2024-08-26 RX ADMIN — Medication 100 MILLIEQUIVALENT(S): at 02:46

## 2024-08-26 RX ADMIN — Medication 20 MILLIGRAM(S): at 21:15

## 2024-08-26 RX ADMIN — Medication 75 MILLIGRAM(S): at 11:10

## 2024-08-26 RX ADMIN — METOPROLOL TARTRATE 25 MILLIGRAM(S): 100 TABLET ORAL at 21:15

## 2024-08-26 RX ADMIN — Medication 1000 MILLIGRAM(S): at 01:42

## 2024-08-26 RX ADMIN — SODIUM CHLORIDE 100 MILLILITER(S): 9 INJECTION INTRAMUSCULAR; INTRAVENOUS; SUBCUTANEOUS at 00:11

## 2024-08-26 RX ADMIN — Medication 100 MILLIEQUIVALENT(S): at 04:59

## 2024-08-26 RX ADMIN — Medication 1 DROP(S): at 01:40

## 2024-08-26 RX ADMIN — ACETAMINOPHEN 650 MILLIGRAM(S): 325 TABLET ORAL at 21:15

## 2024-08-26 RX ADMIN — Medication 100 GRAM(S): at 01:42

## 2024-08-26 RX ADMIN — ACETAMINOPHEN 650 MILLIGRAM(S): 325 TABLET ORAL at 22:15

## 2024-08-26 RX ADMIN — Medication 3 MILLIGRAM(S): at 21:15

## 2024-08-26 RX ADMIN — Medication 81 MILLIGRAM(S): at 11:10

## 2024-08-26 RX ADMIN — Medication 100 MILLIEQUIVALENT(S): at 03:51

## 2024-08-26 RX ADMIN — TAMSULOSIN HYDROCHLORIDE 0.4 MILLIGRAM(S): 0.4 CAPSULE ORAL at 01:42

## 2024-08-26 RX ADMIN — LATANOPROST 1 DROP(S): 50 SOLUTION OPHTHALMIC at 01:41

## 2024-08-26 RX ADMIN — LATANOPROST 1 DROP(S): 50 SOLUTION OPHTHALMIC at 21:15

## 2024-08-26 RX ADMIN — ENOXAPARIN SODIUM 40 MILLIGRAM(S): 100 INJECTION SUBCUTANEOUS at 11:10

## 2024-08-26 RX ADMIN — Medication 1 DROP(S): at 11:09

## 2024-08-26 RX ADMIN — TAMSULOSIN HYDROCHLORIDE 0.4 MILLIGRAM(S): 0.4 CAPSULE ORAL at 21:15

## 2024-08-26 NOTE — CONSULT NOTE ADULT - SUBJECTIVE AND OBJECTIVE BOX
Patient is a 89y old  Female who presents with a chief complaint of NSTEMI  Obstructing L UV junction stone (26 Aug 2024 16:07)      HPI:  89 year old female w anxiety, glaucoma and osteoarthritis came to ED w granddaughter c/o LLQ pain     +left lower quadrant x intermittent 1 month. The pain seems to have started after a fall in the shower about a month ago when she fractured her L clavicle   Recently pain seems to be moving around to the front LLQ, occasionally L back; grades pain as 7/10; not sure what increases or decreases pain  + nausea and vomiting the other night   denied fever, denied change in bowel or urination  +fatigue  +decreased appetite  Granddaughter aware that pt has been wheezing fr a couple of weeks  Saw ORTHO in follow up and is healing nicely w clavicle fx      In ED /80   HR 96   RR 16   T 98.7   93% sat RA  T wave inversions, trop 1700  ASA, plavix, lovenox  CT A/P for LLQ pain + 2mm obstructing UV jx stone w hydro    Being admitted for NSTEMI and obstructing ureteral stone  Echo revealing decreased LVEF 35%  No CP at present      PAST MEDICAL HX  Anxiety   Cataract   Fall w L clavicle fx   Glaucoma  H/O seasonal allergies  Macular degeneration gets injections monthly   OA (osteoarthritis) knees  Occasional tremors      PAST SURGICAL HX  Age-related cataract of both eyes, R , L       FAMILY Hx  heart disease : Maternal Grandmother, Son who  age 61 w LBBB presumed MI      SOCIAL HX  lives alone  never smoker  started walking w a cane on her own   (25 Aug 2024 22:18)      PAST MEDICAL & SURGICAL HISTORY:  Cataract      OA (osteoarthritis)  knees      Occasional tremors      H/O seasonal allergies      Anxiety      Age-related cataract of both eyes, unspecified age-related cataract type  L          HPI:                PREVIOUS DIAGNOSTIC TESTING:      ECHO  FINDINGS:    STRESS  FINDINGS:    CATHETERIZATION  FINDINGS:    MEDICATIONS  (STANDING):  aspirin enteric coated 81 milliGRAM(s) Oral daily  atorvastatin 20 milliGRAM(s) Oral at bedtime  cefTRIAXone Injectable. 1000 milliGRAM(s) IV Push every 24 hours  clopidogrel Tablet 75 milliGRAM(s) Oral daily  dorzolamide 2% Ophthalmic Solution 1 Drop(s) Both EYES <User Schedule>  latanoprost 0.005% Ophthalmic Solution 1 Drop(s) Both EYES at bedtime  melatonin 3 milliGRAM(s) Oral at bedtime  sodium chloride 0.9%. 500 milliLiter(s) (100 mL/Hr) IV Continuous <Continuous>  tamsulosin 0.4 milliGRAM(s) Oral at bedtime    MEDICATIONS  (PRN):  acetaminophen     Tablet .. 650 milliGRAM(s) Oral every 6 hours PRN Temp greater or equal to 38C (100.4F), Mild Pain (1 - 3)  aluminum hydroxide/magnesium hydroxide/simethicone Suspension 30 milliLiter(s) Oral every 4 hours PRN Dyspepsia  calcium carbonate    500 mG (Tums) Chewable 3 Tablet(s) Chew every 6 hours PRN Dyspepsia  morphine  - Injectable 2 milliGRAM(s) IV Push every 5 minutes PRN Chest Pain  senna 2 Tablet(s) Oral at bedtime PRN Constipation      FAMILY HISTORY:      SOCIAL HISTORY:    CIGARETTES:    ALCOHOL:          Vital Signs Last 24 Hrs  T(C): 36.8 (26 Aug 2024 07:23), Max: 37.1 (25 Aug 2024 19:18)  T(F): 98.2 (26 Aug 2024 07:23), Max: 98.8 (25 Aug 2024 19:18)  HR: 82 (26 Aug 2024 07:23) (68 - 98)  BP: 118/71 (26 Aug 2024 07:23) (117/73 - 129/68)  BP(mean): 83 (26 Aug 2024 00:28) (83 - 93)  RR: 18 (26 Aug 2024 07:23) (16 - 18)  SpO2: 96% (26 Aug 2024 07:23) (94% - 98%)    Parameters below as of 26 Aug 2024 07:23  Patient On (Oxygen Delivery Method): nasal cannula  O2 Flow (L/min): 2      PHYSICAL EXAM-    Constitutional: The patient appears to be normal, well developed, well nourished and alert and oriented to time, place and person. The patient does not appear acutely ill. The patient is alert.     Head: Head is normocephalic and atraumatic.      Neck: The patient's neck is supple without enlargement, has no palpable thyromegaly nor thyroid nodules and has no jugular venous distention. No audible carotid bruits. There are strong carotid pulses bilaterally. No JVD.     Cardiovascular: Regular rate and rhythm without S3, S4. No murmurs or rubs are appreciated.      Respiratory: The patient has no rales and no rhonchi. The patient has no wheezes.     Abdomen: Soft, nontender, nondistended with positive bowel sounds.      Extremity: No tenderness. There is no pitting edema, skin discoloration, clubbing and cyanosis.           INTERPRETATION OF TELEMETRY:    ECG:    I&O's Detail      LABS:                        13.0   11.80 )-----------( 138      ( 26 Aug 2024 06:32 )             39.2         141  |  112<H>  |  17  ----------------------------<  111<H>  3.6   |  21<L>  |  0.80    Ca    8.4<L>      26 Aug 2024 06:32  Phos  2.6       Mg     2.3         TPro  6.9  /  Alb  3.8  /  TBili  1.4<H>  /  DBili  x   /  AST  26  /  ALT  16  /  AlkPhos  77  08-25        PT/INR - ( 25 Aug 2024 14:50 )   PT: 14.1 sec;   INR: 1.26 ratio         PTT - ( 25 Aug 2024 14:50 )  PTT:37.2 sec  Urinalysis Basic - ( 26 Aug 2024 06:32 )    Color: x / Appearance: x / SG: x / pH: x  Gluc: 111 mg/dL / Ketone: x  / Bili: x / Urobili: x   Blood: x / Protein: x / Nitrite: x   Leuk Esterase: x / RBC: x / WBC x   Sq Epi: x / Non Sq Epi: x / Bacteria: x      I&O's Summary    BNP  RADIOLOGY & ADDITIONAL STUDIES:
HPI:  "89 year old female w anxiety, glaucoma and osteoarthritis came to ED w granddaughter c/o LLQ pain     +left lower quadrant x intermittent 1 month. The pain seems to have started after a fall in the shower about a month ago when she fractured her L clavicle   Recently pain seems to be moving around to the front LLQ, occasionally L back; grades pain as 7/10; not sure what increases or decreases pain  + nausea and vomiting the other night   denied fever, denied change in bowel or urination  +fatigue  +decreased appetite  Granddaughter aware that pt has been wheezing fr a couple of weeks  Saw ORTHO in follow up and is healing nicely w clavicle fx    In ED /80   HR 96   RR 16   T 98.7   93% sat RA  T wave inversions, trop 1700  trop discussed w Dr. Restrepo; ASA, plavix, lovenox  CT A/P for LLQ pain + 2mm obstructing UV jx stone w hydro    Being admitted for NSTEMI and obstructing ureteral stone   (25 Aug 2024 22:18)"    88 yo female with PMH as above admitted for NSTEMI and left obstructing ureteral stone. Patient seen at bedside, reports she developed left flank/ LLQ pain for the past month which worsened over the past day. Patient states she noted some nausea and vomiting and her pain was 7/10 intermittent. She states she has remained asymptomatic since this morning. She denies hx of stones or recurrent UTIs. Denies dysuria, hematuria, frequency, urgency, fevers or chills.     PAST MEDICAL & SURGICAL HISTORY:  Cataract      OA (osteoarthritis)  knees      Occasional tremors      H/O seasonal allergies      Anxiety      Age-related cataract of both eyes, unspecified age-related cataract type  L          REVIEW OF SYSTEMS   All other review of systems neg, except as noted in HPI    MEDICATIONS  (STANDING):  aspirin enteric coated 81 milliGRAM(s) Oral daily  atorvastatin 20 milliGRAM(s) Oral at bedtime  cefTRIAXone Injectable. 1000 milliGRAM(s) IV Push every 24 hours  clopidogrel Tablet 75 milliGRAM(s) Oral daily  dorzolamide 2% Ophthalmic Solution 1 Drop(s) Both EYES <User Schedule>  latanoprost 0.005% Ophthalmic Solution 1 Drop(s) Both EYES at bedtime  melatonin 3 milliGRAM(s) Oral at bedtime  sodium chloride 0.9%. 500 milliLiter(s) (100 mL/Hr) IV Continuous <Continuous>  tamsulosin 0.4 milliGRAM(s) Oral at bedtime    MEDICATIONS  (PRN):  acetaminophen     Tablet .. 650 milliGRAM(s) Oral every 6 hours PRN Temp greater or equal to 38C (100.4F), Mild Pain (1 - 3)  aluminum hydroxide/magnesium hydroxide/simethicone Suspension 30 milliLiter(s) Oral every 4 hours PRN Dyspepsia  calcium carbonate    500 mG (Tums) Chewable 3 Tablet(s) Chew every 6 hours PRN Dyspepsia  morphine  - Injectable 2 milliGRAM(s) IV Push every 5 minutes PRN Chest Pain  senna 2 Tablet(s) Oral at bedtime PRN Constipation      Allergies    latex (Hives)  No Known Drug Allergies    Intolerances        SOCIAL HISTORY:    FAMILY HISTORY:      Vital Signs Last 24 Hrs  T(C): 36.8 (26 Aug 2024 07:23), Max: 37.1 (25 Aug 2024 14:27)  T(F): 98.2 (26 Aug 2024 07:23), Max: 98.8 (25 Aug 2024 19:18)  HR: 82 (26 Aug 2024 07:23) (68 - 98)  BP: 118/71 (26 Aug 2024 07:23) (117/73 - 133/80)  BP(mean): 83 (26 Aug 2024 00:28) (83 - 93)  RR: 18 (26 Aug 2024 07:23) (16 - 18)  SpO2: 96% (26 Aug 2024 07:23) (93% - 98%)    Parameters below as of 26 Aug 2024 07:23  Patient On (Oxygen Delivery Method): nasal cannula  O2 Flow (L/min): 2      PHYSICAL EXAM:     General: No distress, No anxiety  VITALS  T(C): 36.8 (08-26-24 @ 07:23), Max: 37.1 (08-25-24 @ 14:27)  HR: 82 (08-26-24 @ 07:23) (68 - 98)  BP: 118/71 (08-26-24 @ 07:23) (117/73 - 133/80)  RR: 18 (08-26-24 @ 07:23) (16 - 18)  SpO2: 96% (08-26-24 @ 07:23) (93% - 98%)            Skin     : No jaundice   Lung    : No resp distress  Abdo:   : Soft, Non tender, No guarding, No distension   Back    : No CVAT b/l  Genitalia Female: No Del Castillo  Neuro   : A&Ox3      LABS:                        13.0   11.80 )-----------( 138      ( 26 Aug 2024 06:32 )             39.2     08-26    141  |  112<H>  |  17  ----------------------------<  111<H>  3.6   |  21<L>  |  0.80    Ca    8.4<L>      26 Aug 2024 06:32  Phos  2.6     08-26  Mg     2.3     08-26    TPro  6.9  /  Alb  3.8  /  TBili  1.4<H>  /  DBili  x   /  AST  26  /  ALT  16  /  AlkPhos  77  08-25    PT/INR - ( 25 Aug 2024 14:50 )   PT: 14.1 sec;   INR: 1.26 ratio         PTT - ( 25 Aug 2024 14:50 )  PTT:37.2 sec  Urinalysis Basic - ( 26 Aug 2024 06:32 )    Color: x / Appearance: x / SG: x / pH: x  Gluc: 111 mg/dL / Ketone: x  / Bili: x / Urobili: x   Blood: x / Protein: x / Nitrite: x   Leuk Esterase: x / RBC: x / WBC x   Sq Epi: x / Non Sq Epi: x / Bacteria: x        RADIOLOGY & ADDITIONAL STUDIES:< from: CT Abdomen and Pelvis w/ IV Cont (08.25.24 @ 15:33) >    ACC: [id number] EXAM:  CT ABDOMEN AND PELVIS IC   ORDERED BY: GINNY SANTILLAN     PROCEDURE DATE:  08/25/2024          INTERPRETATION:  CLINICAL INFORMATION: Left lower quadrant pain    COMPARISON: 7/22/2024    CONTRAST/COMPLICATIONS:  IV Contrast:Omnipaque 350  90 cc administered   0 cc discarded  Oral Contrast: NONE  Complications: None reported at time of study completion    PROCEDURE:  CT of the Abdomen and Pelvis was performed.  Sagittal and coronal reformats were performed.    FINDINGS:  LOWER CHEST: Trace bilateral pleural effusions, new    LIVER: Within normal limits.  BILE DUCTS: Normal caliber.  GALLBLADDER: Within normal limits.  SPLEEN: Within normal limits.  PANCREAS: Within normal limits.  ADRENALS: Within normal limits.  KIDNEYS/URETERS: Normal right kidney and collecting system. New moderate   to severe left hydronephrosis and moderate left hydroureter secondary to   a partially obstructing approximately 2 mm calculus located at the left   ureterovesicular junction (2:103).    BLADDER: As per above, calculus at the left UVJ  REPRODUCTIVE ORGANS: Within normal limits    BOWEL: No bowel obstruction.  PERITONEUM/RETROPERITONEUM: Within normal limits.  VESSELS: Within normal limits.  LYMPH NODES: No lymphadenopathy.  ABDOMINAL WALL: Within normal limits.  BONES: Degenerative changes.    IMPRESSION:  Partially obstructing 2 mm left ureterovesicular calculus with resultant   mild to moderate left hydroureter and moderate to severe left   hydronephrosis.        --- Endof Report ---            LUIS EDUARDO URTH MD; Attending Radiologist  This document has been electronically signed. Aug 25 2024  3:44PM    < end of copied text >

## 2024-08-26 NOTE — CONSULT NOTE ADULT - ASSESSMENT
88 yo female with PMH as above admitted for NSTEMI and 2 mm left obstructing ureteral stone at the UVJ.   Pt currently afebrile, VS stable and now completely asymptomtic.  Discussed cystoscopy, ureteral stent placement with patient, risks, benefits and alternatives which include nephrostomy tube or conservative observation/ medical expulsive therapy. Patient would like to like to proceed with conservative management and medical expulsive tx at this time.   Pt with NSTEMI- consider risk factors if taken to OR for intervention.  Recommend  - Flomax 0.4 mg QHS  - Strain urine  - F/U cultures and sensitivities and treat accordingly  - Patient will follow up outpatient for further stone management  - Educated and advised patient if pt develops fevers, chills or worsening symptoms, pt may need urgent stent placement vs nephrostomy tube. Pt verbalized understanding.   - Outpatient imaging to confirm passage of stone in a few weeks if remains asymptomatic     Case discussed with Dr. Trejo
89 year old female with LLQ pain, and ROWE for few months now  Obstructive LUV stone  TTE with decreased LVEF 35%  Unknown prior Hx  Not seen a physician in the past  Troponins 1700, and T wave inversions  [facility] tomorrow  Will start on beta blockers and gradually add more meds for systolic dysfunction.

## 2024-08-27 ENCOUNTER — TRANSCRIPTION ENCOUNTER (OUTPATIENT)
Age: 89
End: 2024-08-27

## 2024-08-27 LAB
ALBUMIN SERPL ELPH-MCNC: 3.1 G/DL — LOW (ref 3.3–5)
ALP SERPL-CCNC: 60 U/L — SIGNIFICANT CHANGE UP (ref 40–120)
ALT FLD-CCNC: 14 U/L — SIGNIFICANT CHANGE UP (ref 12–78)
ANION GAP SERPL CALC-SCNC: 6 MMOL/L — SIGNIFICANT CHANGE UP (ref 5–17)
AST SERPL-CCNC: 15 U/L — SIGNIFICANT CHANGE UP (ref 15–37)
BASOPHILS # BLD AUTO: 0.05 K/UL — SIGNIFICANT CHANGE UP (ref 0–0.2)
BASOPHILS NFR BLD AUTO: 0.6 % — SIGNIFICANT CHANGE UP (ref 0–2)
BILIRUB SERPL-MCNC: 0.8 MG/DL — SIGNIFICANT CHANGE UP (ref 0.2–1.2)
BUN SERPL-MCNC: 15 MG/DL — SIGNIFICANT CHANGE UP (ref 7–23)
CALCIUM SERPL-MCNC: 8.5 MG/DL — SIGNIFICANT CHANGE UP (ref 8.5–10.1)
CHLORIDE SERPL-SCNC: 109 MMOL/L — HIGH (ref 96–108)
CO2 SERPL-SCNC: 24 MMOL/L — SIGNIFICANT CHANGE UP (ref 22–31)
CREAT SERPL-MCNC: 0.65 MG/DL — SIGNIFICANT CHANGE UP (ref 0.5–1.3)
EGFR: 84 ML/MIN/1.73M2 — SIGNIFICANT CHANGE UP
EOSINOPHIL # BLD AUTO: 0.09 K/UL — SIGNIFICANT CHANGE UP (ref 0–0.5)
EOSINOPHIL NFR BLD AUTO: 1 % — SIGNIFICANT CHANGE UP (ref 0–6)
GLUCOSE SERPL-MCNC: 106 MG/DL — HIGH (ref 70–99)
HCT VFR BLD CALC: 36.7 % — SIGNIFICANT CHANGE UP (ref 34.5–45)
HGB BLD-MCNC: 12.3 G/DL — SIGNIFICANT CHANGE UP (ref 11.5–15.5)
IMM GRANULOCYTES NFR BLD AUTO: 1.1 % — HIGH (ref 0–0.9)
LYMPHOCYTES # BLD AUTO: 2.27 K/UL — SIGNIFICANT CHANGE UP (ref 1–3.3)
LYMPHOCYTES # BLD AUTO: 26 % — SIGNIFICANT CHANGE UP (ref 13–44)
MAGNESIUM SERPL-MCNC: 2.1 MG/DL — SIGNIFICANT CHANGE UP (ref 1.6–2.6)
MCHC RBC-ENTMCNC: 29.2 PG — SIGNIFICANT CHANGE UP (ref 27–34)
MCHC RBC-ENTMCNC: 33.5 GM/DL — SIGNIFICANT CHANGE UP (ref 32–36)
MCV RBC AUTO: 87.2 FL — SIGNIFICANT CHANGE UP (ref 80–100)
MONOCYTES # BLD AUTO: 1.42 K/UL — HIGH (ref 0–0.9)
MONOCYTES NFR BLD AUTO: 16.2 % — HIGH (ref 2–14)
NEUTROPHILS # BLD AUTO: 4.81 K/UL — SIGNIFICANT CHANGE UP (ref 1.8–7.4)
NEUTROPHILS NFR BLD AUTO: 55.1 % — SIGNIFICANT CHANGE UP (ref 43–77)
NT-PROBNP SERPL-SCNC: 4911 PG/ML — HIGH (ref 0–450)
PHOSPHATE SERPL-MCNC: 3.1 MG/DL — SIGNIFICANT CHANGE UP (ref 2.5–4.5)
PLATELET # BLD AUTO: 136 K/UL — LOW (ref 150–400)
POTASSIUM SERPL-MCNC: 3.5 MMOL/L — SIGNIFICANT CHANGE UP (ref 3.5–5.3)
POTASSIUM SERPL-SCNC: 3.5 MMOL/L — SIGNIFICANT CHANGE UP (ref 3.5–5.3)
PROT SERPL-MCNC: 5.8 GM/DL — LOW (ref 6–8.3)
RBC # BLD: 4.21 M/UL — SIGNIFICANT CHANGE UP (ref 3.8–5.2)
RBC # FLD: 14.6 % — HIGH (ref 10.3–14.5)
SODIUM SERPL-SCNC: 139 MMOL/L — SIGNIFICANT CHANGE UP (ref 135–145)
T4 AB SER-ACNC: 5 UG/DL — SIGNIFICANT CHANGE UP (ref 4.6–12)
WBC # BLD: 8.74 K/UL — SIGNIFICANT CHANGE UP (ref 3.8–10.5)
WBC # FLD AUTO: 8.74 K/UL — SIGNIFICANT CHANGE UP (ref 3.8–10.5)

## 2024-08-27 PROCEDURE — 99233 SBSQ HOSP IP/OBS HIGH 50: CPT

## 2024-08-27 RX ORDER — METOPROLOL TARTRATE 100 MG/1
50 TABLET ORAL DAILY
Refills: 0 | Status: DISCONTINUED | OUTPATIENT
Start: 2024-08-28 | End: 2024-08-28

## 2024-08-27 RX ORDER — SODIUM CHLORIDE 9 MG/ML
1000 INJECTION INTRAMUSCULAR; INTRAVENOUS; SUBCUTANEOUS
Refills: 0 | Status: DISCONTINUED | OUTPATIENT
Start: 2024-08-27 | End: 2024-08-28

## 2024-08-27 RX ORDER — ENOXAPARIN SODIUM 100 MG/ML
40 INJECTION SUBCUTANEOUS EVERY 24 HOURS
Refills: 0 | Status: DISCONTINUED | OUTPATIENT
Start: 2024-08-28 | End: 2024-08-28

## 2024-08-27 RX ORDER — OXYCODONE HYDROCHLORIDE 5 MG/1
5 TABLET ORAL EVERY 6 HOURS
Refills: 0 | Status: DISCONTINUED | OUTPATIENT
Start: 2024-08-27 | End: 2024-08-28

## 2024-08-27 RX ORDER — LIDOCAINE/BENZALKONIUM/ALCOHOL
1 SOLUTION, NON-ORAL TOPICAL ONCE
Refills: 0 | Status: COMPLETED | OUTPATIENT
Start: 2024-08-27 | End: 2024-08-27

## 2024-08-27 RX ADMIN — Medication 75 MILLIGRAM(S): at 09:41

## 2024-08-27 RX ADMIN — Medication 81 MILLIGRAM(S): at 09:40

## 2024-08-27 RX ADMIN — Medication 20 MILLIGRAM(S): at 21:01

## 2024-08-27 RX ADMIN — ACETAMINOPHEN 650 MILLIGRAM(S): 325 TABLET ORAL at 10:52

## 2024-08-27 RX ADMIN — SODIUM CHLORIDE 50 MILLILITER(S): 9 INJECTION INTRAMUSCULAR; INTRAVENOUS; SUBCUTANEOUS at 17:29

## 2024-08-27 RX ADMIN — METOPROLOL TARTRATE 25 MILLIGRAM(S): 100 TABLET ORAL at 09:43

## 2024-08-27 RX ADMIN — Medication 3 MILLIGRAM(S): at 21:01

## 2024-08-27 RX ADMIN — METOPROLOL TARTRATE 25 MILLIGRAM(S): 100 TABLET ORAL at 21:01

## 2024-08-27 RX ADMIN — Medication 1 DROP(S): at 21:03

## 2024-08-27 RX ADMIN — ACETAMINOPHEN 650 MILLIGRAM(S): 325 TABLET ORAL at 09:49

## 2024-08-27 RX ADMIN — Medication 1 DROP(S): at 09:42

## 2024-08-27 RX ADMIN — LATANOPROST 1 DROP(S): 50 SOLUTION OPHTHALMIC at 21:01

## 2024-08-27 RX ADMIN — TAMSULOSIN HYDROCHLORIDE 0.4 MILLIGRAM(S): 0.4 CAPSULE ORAL at 21:01

## 2024-08-27 RX ADMIN — Medication 1000 MILLIGRAM(S): at 01:12

## 2024-08-27 NOTE — DISCHARGE NOTE NURSING/CASE MANAGEMENT/SOCIAL WORK - NSDCPEFALRISK_GEN_ALL_CORE
For information on Fall & Injury Prevention, visit: [email or web address] OR  [email or web address] OR  https://www.cdc.gov/steadi/patient.html

## 2024-08-27 NOTE — CHART NOTE - NSCHARTNOTEFT_GEN_A_CORE
HPI: 89 year old female w anxiety, glaucoma and osteoarthritis came to ED with left lower quadrant x intermittent 1 month. The pain seems to have started after a fall in the shower about a month ago when she fractured her L clavicle   Recently pain seems to be moving around to the front LLQ, occasionally L back; grades pain as 7/10; not sure what increases or decreases pain  + nausea and vomiting the other night PTA; denied fever, denied change in bowel or urination +fatigue +decreased appetite  Was admitted for NSTEMI and obstructing ureteral stone  Echo revealing decreased LVEF 35%    8/27 S/P LHC revealing normal coronary arteries. Denies chest pain, shortness of breath, dizziness or palpitations at this time    PHYSICAL EXAM:  Constitutional: NAD,  Neuro: Alert and oriented x 3  Speech clear No focal deficits  Respiratory: CTAB  Cardiovascular: S1 and S2, RRR,   Gastrointestinal: BS+, soft, NT/ND  Extremities: No clubbing cyanosis or edema   Vascular: 2+ peripheral pulses  Psychiatric: Normal mood, normal affect  Musculoskeletal: 5/5 strength b/l upper and lower extremities  Right groin procedure site with Mynx closure;  no bleeding, no hematoma, site soft, non tender, positive pedal pulses bilaterally    Vital Signs Last 24 Hrs  T(C): 37.2 (27 Aug 2024 15:02), Max: 37.2 (27 Aug 2024 15:02)  T(F): 99 (27 Aug 2024 15:02), Max: 99 (27 Aug 2024 15:02)  HR: 79 (27 Aug 2024 18:15) (77 - 98)  BP: 110/67 (27 Aug 2024 18:15) (105/68 - 128/77)  RR: 17 (27 Aug 2024 18:15) (16 - 18)  SpO2: 96% (27 Aug 2024 18:15) (95% - 96%)    Parameters below as of 27 Aug 2024 18:15  Patient On (Oxygen Delivery Method): room air    S/P LHC: normal coronaries    -continue tele  -continue hospitalist service  -plan of care discussed with patient, family and MD   -post procedure instructions reviewed  -follow up with MD in 1-2 weeks  -Discussed therapeutic lifestyle changes to reduce risk factors such as following a cardiac diet, weight loss, maintaining a healthy weight, exercise, smoking cessation, medication compliance, and regular follow-up  with MD

## 2024-08-27 NOTE — DISCHARGE NOTE NURSING/CASE MANAGEMENT/SOCIAL WORK - PATIENT PORTAL LINK FT
You can access the FollowMyHealth Patient Portal offered by [facility] by registering at the following website: [email or web address]. By joining [employer]’s FollowMyHealth portal, you will also be able to view your health information using other applications (apps) compatible with our system.

## 2024-08-27 NOTE — DISCHARGE NOTE NURSING/CASE MANAGEMENT/SOCIAL WORK - NSDCFUADDAPPT_GEN_ALL_CORE_FT
NEW PCP :  Dr Michael Xie, [address], [address], [place] [address].  [[phone]]  Sept 16, 2024 @ [facility]. NEW PCP :  Dr Michael Xie, [address], [address], [place] [address].  [[phone]]  Sept 16, 2024 @ [facility] to give patient/daughter a script for Outpatient PT NEW PCP :  Dr Michael Xie, [address], [address], [place] [facility] [address].  [[phone]]  Sept 16, 2024 @ [facility] to give patient/daughter a script for Outpatient PT      Follow Up Appointment: Cardiology	Rosaura RYDER  9/13 @9:30AM

## 2024-08-27 NOTE — PACU DISCHARGE NOTE - COMMENTS
report given to eddi Sharma verified on tele ilir Dawson pt and family verbalizes understanding of post procedure care education emotional support provided maintained safety will continue to monitor

## 2024-08-27 NOTE — BRIEF OPERATIVE NOTE - SPECIMENS
Pharmacy called stating that they only have enough of the patient's oxcarbazepine (TRILEPTAL) 300 MG tablet to dispense 39 pills for the patient's bubble pack   none

## 2024-08-28 ENCOUNTER — NON-APPOINTMENT (OUTPATIENT)
Age: 89
End: 2024-08-28

## 2024-08-28 ENCOUNTER — TRANSCRIPTION ENCOUNTER (OUTPATIENT)
Age: 89
End: 2024-08-28

## 2024-08-28 VITALS
SYSTOLIC BLOOD PRESSURE: 103 MMHG | TEMPERATURE: 98 F | DIASTOLIC BLOOD PRESSURE: 63 MMHG | OXYGEN SATURATION: 96 % | RESPIRATION RATE: 18 BRPM | HEART RATE: 87 BPM

## 2024-08-28 PROCEDURE — 99239 HOSP IP/OBS DSCHRG MGMT >30: CPT

## 2024-08-28 RX ORDER — LISINOPRIL 10 MG/1
1 TABLET ORAL
Qty: 0 | Refills: 0 | DISCHARGE
Start: 2024-08-28

## 2024-08-28 RX ORDER — TAMSULOSIN HYDROCHLORIDE 0.4 MG/1
1 CAPSULE ORAL
Qty: 30 | Refills: 0
Start: 2024-08-28 | End: 2024-09-26

## 2024-08-28 RX ORDER — POLYETHYLENE GLYCOL 3350 17 G/17G
17 POWDER, FOR SOLUTION ORAL
Qty: 1 | Refills: 0
Start: 2024-08-28 | End: 2024-09-26

## 2024-08-28 RX ORDER — LISINOPRIL 10 MG/1
2.5 TABLET ORAL DAILY
Refills: 0 | Status: DISCONTINUED | OUTPATIENT
Start: 2024-08-28 | End: 2024-08-28

## 2024-08-28 RX ORDER — METOPROLOL TARTRATE 100 MG/1
1 TABLET ORAL
Qty: 30 | Refills: 0
Start: 2024-08-28 | End: 2024-09-26

## 2024-08-28 RX ORDER — SENNA 187 MG
1 TABLET ORAL
Qty: 30 | Refills: 0
Start: 2024-08-28 | End: 2024-09-26

## 2024-08-28 RX ORDER — TRAMADOL HYDROCHLORIDE 200 MG/1
1 TABLET, EXTENDED RELEASE ORAL
Qty: 12 | Refills: 0
Start: 2024-08-28 | End: 2024-08-30

## 2024-08-28 RX ORDER — LISINOPRIL 10 MG/1
1 TABLET ORAL
Qty: 30 | Refills: 0
Start: 2024-08-28 | End: 2024-09-26

## 2024-08-28 RX ORDER — ASPIRIN 81 MG
1 TABLET, DELAYED RELEASE (ENTERIC COATED) ORAL
Qty: 30 | Refills: 0
Start: 2024-08-28 | End: 2024-09-26

## 2024-08-28 RX ADMIN — Medication 75 MILLIGRAM(S): at 10:04

## 2024-08-28 RX ADMIN — ENOXAPARIN SODIUM 40 MILLIGRAM(S): 100 INJECTION SUBCUTANEOUS at 00:42

## 2024-08-28 RX ADMIN — Medication 1000 MILLIGRAM(S): at 01:12

## 2024-08-28 RX ADMIN — Medication 1 DROP(S): at 10:04

## 2024-08-28 RX ADMIN — Medication 81 MILLIGRAM(S): at 10:04

## 2024-08-28 RX ADMIN — METOPROLOL TARTRATE 50 MILLIGRAM(S): 100 TABLET ORAL at 10:04

## 2024-08-28 NOTE — DISCHARGE NOTE PROVIDER - HOSPITAL COURSE
FROM H&P:    "89 year old female w anxiety, glaucoma and osteoarthritis came to ED w granddaughter c/o LLQ pain     +left lower quadrant x intermittent 1 month. The pain seems to have started after a fall in the shower about a month ago when she fractured her L clavicle   Recently pain seems to be moving around to the front LLQ, occasionally L back; grades pain as 7/10; not sure what increases or decreases pain  + nausea and vomiting the other night   denied fever, denied change in bowel or urination  +fatigue  +decreased appetite  Granddaughter aware that pt has been wheezing fr a couple of weeks  Saw ORTHO in follow up and is healing nicely w clavicle fx      In ED /80   HR 96   RR 16   T 98.7   93% sat RA  T wave inversions, trop 1700  trop discussed w Dr. Restrepo; ASA, plavix, lovenox  CT A/P for LLQ pain + 2mm obstructing UV jx stone w hydro    Being admitted for NSTEMI and obstructing ureteral stone."    8/26: LLQ abdominal pain resolved. Denies fever, chills, nauseaa, vomiting. Possibly passed stone. Trop downtrending. Of note patient with reported ROWE, intermittent wheezing and orthopnea for months prior to admission. Troponin without significant rise and Twave inversions could be from supply demand ischemia from urologic issue and possibly could have had cardiac event months prior? Espsecially because of reported HF symptoms for months. Denies fever, chills, chest pain. TTE with EF 30-35%. Discussed with cards->possible LHC. Continue to monitor closely.     8/27: Recurrent of left flank pain. 7-9/10; Oxycodone PRN ordered. Lidocain patch ordered. Baseline ROWE unchanged. No chest pain. Urine culture with E. Coli. Only 10-49K CFU but given presence of stone. Will treat for UTI. Tentative LHC today. Card consult/recs. Lopressor to toprol tomorrow.     8/28: Pain resolved. s/p LHC->NICM. Rx management. Cleard by cardiology. Completed and antibiotic. Urine culture with less than 100K CFU of E. Coli. No urinary symptoms. Discharge home in stable condition and close outpatient follow up with PMD and cardiologuy      #More likely with Type II MI on Admission   #New HFrEF (systolic and diastolic) with Exacerbation/NICM  #Fatigue  #Prolonged QT  - admit to telemetry  - serial trop with no signifiant risre  -EKG SR w T wave inversion  - mg in ED then asa 81 mg  -plavix 300 mg then 75 mg qd  -TTE with EF 32%, Grade 1 diastolic dysfunction, mild mr/tr, mild to moderate R  - CARD consult/recs->tentative LHC 8/27->NICM->BB and lisinopril->cleared by cardiology for discharge.  - Statin  - lipids, Hb A1c  -BB      #Urolithiasis obstructing L UVJ stone with associate Hydronephrosis and Intractabl LLQ and Left Flank Pain. Resolved. t  #E. Coli UTI  - ceftriaxone  -  cc as 100 cc/hr->DC with new low EF. Encourage adequate oral intake  - Pain resolved->passed stone?->recurrence of pain 8/27. Continue pain control.   -. flomax 0.4  - Urology consult->conservative management. PO hydration. Pain control->outpatient follow up for repeat imaging.       #CHronic Clavicle fx  healing as per pt and family after ORTHO visit        #Glaucoma : both eyes  -latanoprost q HS  - dorolamide BID      #Macular degeneration  for monthly injections        #VTE  on lovenox    T(C): 36.7 (08-28-24 @ 09:33), Max: 37.2 (08-27-24 @ 15:02)  HR: 87 (08-28-24 @ 09:33) (77 - 87)  BP: 103/63 (08-28-24 @ 09:33) (103/63 - 119/69)  RR: 18 (08-28-24 @ 09:33) (16 - 18)  SpO2: 96% (08-28-24 @ 09:33) (95% - 96%)  General: AAOx3; NAD  Head: AT/NC  ENT: Moist Mucous Membranes; No Injury  Eyes: EOMI; PERRL  Neck: Non-tender; No JVD  CVS: RRR, S1&S2, No murmur,  Respiratory: Lungs CTA B/L; Normal Respiratory Effort  Abdomen/GI: Soft, non-tender, non-distended, no guarding, no rebound, normal bowel sounds  : No bladder distention, No Del Castillo  Extremities: No cyanosis, No clubbing,   MSK: Left CVA tenderness resolved, Normal ROM, No injury  Neuro: AAOx3, CNII-XII grossly intact, non-focal  Psych: Appropriate, Cooperative, Anxious +  Skin: Clean, Dry and Intact  Discharge Management: 38 minutess  Date of Discharge/Service: 8/28/2024

## 2024-08-28 NOTE — DISCHARGE NOTE PROVIDER - PROVIDER TOKENS
PROVIDER:[TOKEN:[[id number]],FOLLOWUP:[1 week],ESTABLISHEDPATIENT:[T]],PROVIDER:[TOKEN:[[id number]],FOLLOWUP:[2 weeks],ESTABLISHEDPATIENT:[T]]

## 2024-08-28 NOTE — DISCHARGE NOTE PROVIDER - NSDCCPCAREPLAN_GEN_ALL_CORE_FT
PRINCIPAL DISCHARGE DIAGNOSIS  Diagnosis: Cardiomyopathy  Assessment and Plan of Treatment: Normal heart pumps 55-65% of the blood in the main chamber of the heart (left ventricle) to the rest of the body. Your heart is pumping at 30-35%. There are many methods to optimize the blood flow with medication. Take medications as prescribed.  You underwent heart procedure to evaluate for blockages in arteries of the heart. No blockage/narrowings seen making this non-ischemic cardiomyopathy.   For your heart failure:  Weigh yourself daily with the same scale. Any weight gain/loss greater than 2-3 pounds over 2 days or 5 pounds in a week, notify your cardiologist and primary medical doctor as it may indicate that your are retaining too much water or losing too much water. In which case, it may require dose adjustments to your diuretic(s).  Maintain low salt diet.  -Less than 2 Grams (2 Grams = 2000 milligrams) of daily salt intake. Too much salt intake can potentiate fluid retention.  Get repeat blood work to check your kidney function and electrolytes in 1 week with your cardiologist or primary medical doctor or nephrologist (if applicable)      SECONDARY DISCHARGE DIAGNOSES  Diagnosis: Renal colic  Assessment and Plan of Treatment: Pain due to kidney stone. Drink adequate amount of water/hydration. Do NOT over or under hydrate. Moderate hydration ~6 cups of water daily. UndFollow up with the Urologist.    Diagnosis: Abdominal pain  Assessment and Plan of Treatment: Continue pain medications as tolerated. Pain medication administered in the hospital may have caused some constipation. Adjust bowel regimen/laxatives as needed to allow for one bowel movement atleast every 48 hours.

## 2024-08-28 NOTE — DISCHARGE NOTE PROVIDER - CARE PROVIDER_API CALL
Candis Higginbotham  Interventional Cardiology  [address] [place] [address]  Phone: [phone]  Fax: [phone]  Established Patient  Follow Up Time: 1 week    Tree Trejo  Urology  [address], [address]  [place] [address]  Phone: [phone]  Fax: [phone]  Established Patient  Follow Up Time: 2 weeks

## 2024-08-28 NOTE — PROGRESS NOTE ADULT - ASSESSMENT
89 year old female with LLQ pain, and ROWE for few months now  Obstructive LUV stone  TTE with decreased LVEF 35%  Unknown prior Hx  Not seen a physician in the past  Troponins 1700, and T wave inversions  [facility] normal cors  Up titrate betablockers  DC Clopidogrel  Start lisinopril 2.5 mg  To follow as outpatient in one week  
MEDICATIONS  (STANDING):  aspirin enteric coated 81 milliGRAM(s) Oral daily  atorvastatin 20 milliGRAM(s) Oral at bedtime  cefTRIAXone Injectable. 1000 milliGRAM(s) IV Push every 24 hours  clopidogrel Tablet 75 milliGRAM(s) Oral daily  dorzolamide 2% Ophthalmic Solution 1 Drop(s) Both EYES <User Schedule>  latanoprost 0.005% Ophthalmic Solution 1 Drop(s) Both EYES at bedtime  melatonin 3 milliGRAM(s) Oral at bedtime  sodium chloride 0.9%. 500 milliLiter(s) (100 mL/Hr) IV Continuous <Continuous>  tamsulosin 0.4 milliGRAM(s) Oral at bedtime    MEDICATIONS  (PRN):  acetaminophen     Tablet .. 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 Resolved. t  #UTI. Possible  - ceftriaxone  -  cc as 100 cc/hr->DC with new low EF. Encourage adequate oral intake  - Pain resolved->passed stone?  -. flomax 0.4  - Urology consult->conservative management. PO hydration. Pain control->outpatient follow up for repeat imaging.       #Clavicle fx  healing as per pt and family after ORTHO visit        #Glaucoma : both eyes  -latanoprost q HS  - dorolamide BID      #Macular degeneration  for monthly injections        #VTE  on lovenox      GOC:  Full code  Started discussions on code status. No Advanced directives. Presently to remain full code.    I spent a total of 51 minutes in face-to-face time with the patient and on the floor managing patient including coordination of care. Overall 50% of the time spent in discussion of the diagnosis, counseling and treatment plan.   
89 year old female with LLQ pain, and ROWE for few months now  Obstructive LUV stone  TTE with decreased LVEF 35%  Unknown prior Hx  Not seen a physician in the past  Troponins 1700, and T wave inversions  [facility] normal cors  Up titrate betablockers  To follow as outpatient  
MEDICATIONS  (STANDING):  aspirin enteric coated 81 milliGRAM(s) Oral daily  atorvastatin 20 milliGRAM(s) Oral at bedtime  cefTRIAXone Injectable. 1000 milliGRAM(s) IV Push every 24 hours  clopidogrel Tablet 75 milliGRAM(s) Oral daily  dorzolamide 2% Ophthalmic Solution 1 Drop(s) Both EYES <User Schedule>  latanoprost 0.005% Ophthalmic Solution 1 Drop(s) Both EYES at bedtime  lidocaine   4% Patch 1 Patch Transdermal once  melatonin 3 milliGRAM(s) Oral at bedtime  metoprolol tartrate 25 milliGRAM(s) Oral two times a day  sodium chloride 0.9%. 500 milliLiter(s) (100 mL/Hr) IV Continuous <Continuous>  tamsulosin 0.4 milliGRAM(s) Oral at bedtime    MEDICATIONS  (PRN):  acetaminophen     Tablet .. 650 milliGRAM(s) Oral every 6 hours PRN Temp greater or equal to 38C (100.4F), Mild Pain (1 - 3)  aluminum hydroxide/magnesium hydroxide/simethicone Suspension 30 milliLiter(s) Oral every 4 hours PRN Dyspepsia  calcium carbonate    500 mG (Tums) Chewable 3 Tablet(s) Chew every 6 hours PRN Dyspepsia  morphine  - Injectable 2 milliGRAM(s) IV Push every 5 minutes PRN Chest Pain  oxyCODONE    IR 5 milliGRAM(s) Oral every 6 hours PRN Severe Pain (7 - 10)  senna 2 Tablet(s) Oral at bedtime PRN Constipation        89 year old female w anxiety, glaucoma and osteoarthritis came to ED w granddaughter c/o LLQ pain       #NSTEMI vs Type II MI  #New HFrEF (systolic and diastolic)  #Fatigue  #Prolonged QT  - admit to telemetry  - serial trop with no signifiant risre  -EKG SR w T wave inversion  - mg in ED then asa 81 mg  -plavix 300 mg then 75 mg qd  -TTE with EF 32%, Grade 1 diastolic dysfunction, mild mr/tr, mild to moderate R  - CARD consult/recs->tentative [facility] 8/27  - Statin  - lipids, Hb A1c  -BB  -Up titrate GDMT          #Urolithiasis obstructing L UVJ stone with associate Hydronephrosis and Intractabl LLQ and Left Flank Pain. Resolved. t  #E. Coli UTI  - ceftriaxone  -  cc as 100 cc/hr->DC with new low EF. Encourage adequate oral intake  - Pain resolved->passed stone?->recurrence of pain 8/27. Continue pain control.   -. flomax 0.4  - Urology consult->conservative management. PO hydration. Pain control->outpatient follow up for repeat imaging.       #CHronic Clavicle fx  healing as per pt and family after ORTHO visit        #Glaucoma : both eyes  -latanoprost q HS  - dorolamide BID      #Macular degeneration  for monthly injections        #VTE  on lovenox      GOC:  Full code  Started discussions on code status. No Advanced directives. Presently to remain full code.    I spent a total of 53 minutes in face-to-face time with the patient and on the floor managing patient including coordination of care. Overall 50% of the time spent in discussion of the diagnosis, counseling and treatment plan.

## 2024-08-28 NOTE — DISCHARGE NOTE PROVIDER - NSDCMRMEDTOKEN_GEN_ALL_CORE_FT
aspirin 81 mg oral delayed release tablet: 1 tab(s) orally once a day  atorvastatin 20 mg oral tablet: 1 tab(s) orally once a day (at bedtime)  dorzolamide 2% ophthalmic solution: 1 drop(s) in each eye 2 times a day  latanoprost 0.005% ophthalmic solution: 1 drop(s) in each eye once a day (at bedtime)  lisinopril 2.5 mg oral tablet: 1 tab(s) orally once a day  metoprolol succinate 50 mg oral tablet, extended release: 1 tab(s) orally once a day  MiraLax oral powder for reconstitution: 17 gram(s) orally once a day Hold if greater than 2 bowel movements in 24 hours  Senna 8.6 mg oral tablet: 1 tab(s) orally once a day (at bedtime)  tamsulosin 0.4 mg oral capsule: 1 cap(s) orally once a day (at bedtime)  traMADol 25 mg oral tablet: 1 tab(s) orally every 6 hours as needed for  severe pain MDD: 4 tablets

## 2024-08-28 NOTE — PROGRESS NOTE ADULT - REASON FOR ADMISSION
NSTEMI  Obstructing L UV junction stone

## 2024-08-28 NOTE — DISCHARGE NOTE PROVIDER - CARE PROVIDERS DIRECT ADDRESSES
[email or web address]

## 2024-08-28 NOTE — PROGRESS NOTE ADULT - SUBJECTIVE AND OBJECTIVE BOX
CHIEF COMPLAINT: LLQ abdominal pain (now resolved); SOB/ROWE    SUBJECTIVE/SIGNIFICANT INTERVAL EVENTS/OVERNIGHT EVENTS:    8/26: LLQ abdominal pain resolved. Denies fever, chills, nauseaa, vomiting. Possibly passed stone. Trop downtrending. Of note patient with reported ROWE, intermittent wheezing and orthopnea for months prior to admission. Troponin without significant rise and Twave inversions could be from supply demand ischemia from urologic issue and possibly could have had cardiac event months prior? Espsecially because of reported HF symptoms for months. Denies fever, chills, chest pain. TTE with EF 30-35%. Discussed with cards->possible LHC. Continue to monitor closely.     8/27: Recurrent of left flank pain. 7-9/10; Oxycodone PRN ordered. Lidocain patch ordered. Baseline ROWE unchanged. No chest pain. Urine culture with E. Coli. Only 10-49K CFU but given presence of stone. Will treat for UTI. Tentative LHC today. Card consult/recs. Lopressor to toprol tomorrow.     Review of Systems: 14 Point review of systems reviewed and reported as negative unless otherwise stated above    FROM H&P:  "89 year old female w anxiety, glaucoma and osteoarthritis came to ED w granddaughter c/o LLQ pain     +left lower quadrant x intermittent 1 month. The pain seems to have started after a fall in the shower about a month ago when she fractured her L clavicle   Recently pain seems to be moving around to the front LLQ, occasionally L back; grades pain as 7/10; not sure what increases or decreases pain  + nausea and vomiting the other night   denied fever, denied change in bowel or urination  +fatigue  +decreased appetite  Granddaughter aware that pt has been wheezing fr a couple of weeks  Saw ORTHO in follow up and is healing nicely w clavicle fx      In ED /80   HR 96   RR 16   T 98.7   93% sat RA  T wave inversions, trop 1700  trop discussed w Dr. Restrepo; ASA, plavix, lovenox  CT A/P for LLQ pain + 2mm obstructing UV jx stone w hydro    Being admitted for NSTEMI and obstructing ureteral stone"    PHYSICAL EXAM:    T(C): 36.3 (08-27-24 @ 08:09), Max: 36.7 (08-26-24 @ 20:34)  HR: 79 (08-27-24 @ 08:09) (79 - 98)  BP: 118/72 (08-27-24 @ 08:09) (118/72 - 128/77)  RR: 18 (08-27-24 @ 08:09) (18 - 18)  SpO2: 95% (08-27-24 @ 08:09) (95% - 95%)  General: AAOx3; NAD  Head: AT/NC  ENT: Moist Mucous Membranes; No Injury  Eyes: EOMI; PERRL  Neck: Non-tender; No JVD  CVS: RRR, S1&S2, No murmur,  Respiratory: Lungs CTA B/L; Normal Respiratory Effort  Abdomen/GI: Soft, non-tender, non-distended, no guarding, no rebound, normal bowel sounds  : No bladder distention, No Del Castillo  Extremities: No cyanosis, No clubbing,   MSK: Left CVA tenderness, Normal ROM, No injury  Neuro: AAOx3, CNII-XII grossly intact, non-focal  Psych: Appropriate, Cooperative, Anxious +  Skin: Clean, Dry and Intact      LABS:                          12.3   8.74  )-----------( 136      ( 27 Aug 2024 06:42 )             36.7     08-27    139  |  109<H>  |  15  ----------------------------<  106<H>  3.5   |  24  |  0.65    Ca    8.5      27 Aug 2024 06:42  Phos  3.1     08-27  Mg     2.1     08-27    TPro  5.8<L>  /  Alb  3.1<L>  /  TBili  0.8  /  DBili  x   /  AST  15  /  ALT  14  /  AlkPhos  60  08-27      CAPILLARY BLOOD GLUCOSE          Urinalysis with Rflx Culture (collected 25 Aug 2024 22:55)    Culture - Urine (collected 25 Aug 2024 22:55)  Source: Clean Catch None  Preliminary Report (27 Aug 2024 07:43):    10,000 - 49,000 CFU/mL Escherichia coli                            13.0   11.80 )-----------( 138      ( 26 Aug 2024 06:32 )             39.2     08-26    141  |  112<H>  |  17  ----------------------------<  111<H>  3.6   |  21<L>  |  0.80    Ca    8.4<L>      26 Aug 2024 06:32  Phos  2.6     08-26  Mg     2.3     08-26    TPro  6.9  /  Alb  3.8  /  TBili  1.4<H>  /  DBili  x   /  AST  26  /  ALT  16  /  AlkPhos  77  08-25      CAPILLARY BLOOD GLUCOSE          Urinalysis with Rflx Culture (collected 25 Aug 2024 22:55)    Thyroid Stimulating Hormone, Serum in AM (08.26.24 @ 06:32)   Thyroid Stimulating Hormone, Serum: 7.16 uU/mLA1C with Estimated Average Glucose in AM (08.26.24 @ 06:32)   A1C with Estimated Average Glucose Result: 5.2Uric Acid (08.26.24 @ 06:32)   Uric Acid: 5.0 mg/dL      RADIOLOGY:  < from: Xray Chest 1 View- PORTABLE-Routine (Xray Chest 1 View- PORTABLE-Routine .) (08.25.24 @ 16:28) >    FINDINGS:    Single frontal view of the chest demonstrates the lungs to be clear. The   cardiomediastinal silhouette is enlarged. No acute osseous abnormalities.   The patient is rotated. Widened mediastinum. Consider follow-up or CT.    IMPRESSION: No acute cardiopulmonary disease process. Cardiomegaly.    < end of copied text >      EKG:  < from: 12 Lead ECG (08.26.24 @ 09:34) >  Diagnosis Line Sinus rhythm with occasional Premature ventricular complexes and Premature atrial complexes  Left axis deviation  Minimal voltage criteria for LVH, may be normal variant ( Brooks product )  Poor R wave progression.  T wave abnormality, consider inferolateral ischemia  Prolonged QT  Abnormal ECG    < end of copied text >      ECHO:  < from: TTE W or WO Ultrasound Enhancing Agent (08.26.24 @ 08:21) >   1. Left ventricular wall thickness is mildly increased. Left ventricular systolic function is severely decreased with an ejection fraction of 32 % by Mora's method of disks. Regional wall motion abnormalities present.   2. There is mild (grade 1) left ventricular diastolic dysfunction.   3. Normal right ventricular cavity size and normal right ventricular systolic function.   4. Left atrium is normal in size.   5. The right atrium is normal in size.   6. Mild mitral regurgitation.   7. Mild tricuspid regurgitation.   8. Mild left ventricular hypertrophy.   9. Mild to moderate aortic regurgitation.  10. There is mild calcification of the mitral valve annulus.    < end of copied text >              I personally reviewed labs, imaging, ekg, orders and vitals.    Discussed case with:  [X]RN  [X]CM/DUARTE  [X]Patient  [X]Family  [X]Specialist: Cardiology/Urology          
CHIEF COMPLAINT: LLQ abdominal pain (now resolved); SOB/ROWE    SUBJECTIVE/SIGNIFICANT INTERVAL EVENTS/OVERNIGHT EVENTS:    8/26: LLQ abdominal pain resolved. Denies fever, chills, nauseaa, vomiting. Possibly passed stone. Trop downtrending. Of note patient with reported ROWE, intermittent wheezing and orthopnea for months prior to admission. Troponin without significant rise and Twave inversions could be from supply demand ischemia from urologic issue and possibly could have had cardiac event months prior? Espsecially because of reported HF symptoms for months. Denies fever, chills, chest pain. TTE with EF 30-35%. Discussed with cards->possible LHC. Continue to monitor closely.     Review of Systems: 14 Point review of systems reviewed and reported as negative unless otherwise stated above    FROM H&P:  "89 year old female w anxiety, glaucoma and osteoarthritis came to ED w granddaughter c/o LLQ pain     +left lower quadrant x intermittent 1 month. The pain seems to have started after a fall in the shower about a month ago when she fractured her L clavicle   Recently pain seems to be moving around to the front LLQ, occasionally L back; grades pain as 7/10; not sure what increases or decreases pain  + nausea and vomiting the other night   denied fever, denied change in bowel or urination  +fatigue  +decreased appetite  Granddaughter aware that pt has been wheezing fr a couple of weeks  Saw ORTHO in follow up and is healing nicely w clavicle fx      In ED /80   HR 96   RR 16   T 98.7   93% sat RA  T wave inversions, trop 1700  trop discussed w Dr. Restrepo; ASA, plavix, lovenox  CT A/P for LLQ pain + 2mm obstructing UV jx stone w hydro    Being admitted for NSTEMI and obstructing ureteral stone"    PHYSICAL EXAM:    T(C): 36.8 (08-26-24 @ 07:23), Max: 37.1 (08-25-24 @ 19:18)  HR: 82 (08-26-24 @ 07:23) (68 - 98)  BP: 118/71 (08-26-24 @ 07:23) (117/73 - 129/68)  RR: 18 (08-26-24 @ 07:23) (16 - 18)  SpO2: 96% (08-26-24 @ 07:23) (94% - 98%)    General: AAOx3; NAD  Head: AT/NC  ENT: Moist Mucous Membranes; No Injury  Eyes: EOMI; PERRL  Neck: Non-tender; No JVD  CVS: RRR, S1&S2, No murmur, No edema  Respiratory: Lungs CTA B/L; Normal Respiratory Effort  Abdomen/GI: Soft, non-tender, non-distended, no guarding, no rebound, normal bowel sounds  : No bladder distention, No Del Castillo  Extremities: No cyanosis, No clubbing, No edema  MSK: No CVA tenderness, Normal ROM, No injury  Neuro: AAOx3, CNII-XII grossly intact, non-focal  Psych: Appropriate, Cooperative, No depression, No anxiety  Skin: Clean, Dry and Intact      LABS:                          13.0   11.80 )-----------( 138      ( 26 Aug 2024 06:32 )             39.2     08-26    141  |  112<H>  |  17  ----------------------------<  111<H>  3.6   |  21<L>  |  0.80    Ca    8.4<L>      26 Aug 2024 06:32  Phos  2.6     08-26  Mg     2.3     08-26    TPro  6.9  /  Alb  3.8  /  TBili  1.4<H>  /  DBili  x   /  AST  26  /  ALT  16  /  AlkPhos  77  08-25      CAPILLARY BLOOD GLUCOSE          Urinalysis with Rflx Culture (collected 25 Aug 2024 22:55)    Thyroid Stimulating Hormone, Serum in AM (08.26.24 @ 06:32)   Thyroid Stimulating Hormone, Serum: 7.16 uU/mLA1C with Estimated Average Glucose in AM (08.26.24 @ 06:32)   A1C with Estimated Average Glucose Result: 5.2Uric Acid (08.26.24 @ 06:32)   Uric Acid: 5.0 mg/dL      RADIOLOGY:  < from: Xray Chest 1 View- PORTABLE-Routine (Xray Chest 1 View- PORTABLE-Routine .) (08.25.24 @ 16:28) >    FINDINGS:    Single frontal view of the chest demonstrates the lungs to be clear. The   cardiomediastinal silhouette is enlarged. No acute osseous abnormalities.   The patient is rotated. Widened mediastinum. Consider follow-up or CT.    IMPRESSION: No acute cardiopulmonary disease process. Cardiomegaly.    < end of copied text >      EKG:  < from: 12 Lead ECG (08.26.24 @ 09:34) >  Diagnosis Line Sinus rhythm with occasional Premature ventricular complexes and Premature atrial complexes  Left axis deviation  Minimal voltage criteria for LVH, may be normal variant ( Ronny product )  Poor R wave progression.  T wave abnormality, consider inferolateral ischemia  Prolonged QT  Abnormal ECG    < end of copied text >      ECHO:  < from: TTE W or WO Ultrasound Enhancing Agent (08.26.24 @ 08:21) >   1. Left ventricular wall thickness is mildly increased. Left ventricular systolic function is severely decreased with an ejection fraction of 32 % by Mora's method of disks. Regional wall motion abnormalities present.   2. There is mild (grade 1) left ventricular diastolic dysfunction.   3. Normal right ventricular cavity size and normal right ventricular systolic function.   4. Left atrium is normal in size.   5. The right atrium is normal in size.   6. Mild mitral regurgitation.   7. Mild tricuspid regurgitation.   8. Mild left ventricular hypertrophy.   9. Mild to moderate aortic regurgitation.  10. There is mild calcification of the mitral valve annulus.    < end of copied text >              I personally reviewed labs, imaging, ekg, orders and vitals.    Discussed case with:  [X]RN  [X]RYAN/DUARTE  [X]Patient  [X]Family  [X]Specialist: Cardiology/Urology          
Patient is a 89y old  Female who presents with a chief complaint of NSTEMI  Obstructing L UV junction stone (26 Aug 2024 16:07)      HPI:  89 year old female w anxiety, glaucoma and osteoarthritis came to ED w granddaughter c/o LLQ pain     +left lower quadrant x intermittent 1 month. The pain seems to have started after a fall in the shower about a month ago when she fractured her L clavicle   Recently pain seems to be moving around to the front LLQ, occasionally L back; grades pain as 7/10; not sure what increases or decreases pain  + nausea and vomiting the other night   denied fever, denied change in bowel or urination  +fatigue  +decreased appetite  Granddaughter aware that pt has been wheezing fr a couple of weeks  Saw ORTHO in follow up and is healing nicely w clavicle fx      In ED /80   HR 96   RR 16   T 98.7   93% sat RA  T wave inversions, trop 1700  ASA, plavix, lovenox  CT A/P for LLQ pain + 2mm obstructing UV jx stone w hydro    Being admitted for NSTEMI and obstructing ureteral stone  Echo revealing decreased LVEF 35%  No CP at present     S/p LHC revealing normal coronary arteries  Possible non-ischeamic vs Takatsoubou cardiomyopathy      PAST MEDICAL HX  Anxiety   Cataract   Fall w L clavicle fx   Glaucoma  H/O seasonal allergies  Macular degeneration gets injections monthly   OA (osteoarthritis) knees  Occasional tremors      PAST SURGICAL HX  Age-related cataract of both eyes, R , L       FAMILY Hx  heart disease : Maternal Grandmother, Son who  age 61 w LBBB presumed MI      SOCIAL HX  lives alone  never smoker  started walking w a cane on her own   (25 Aug 2024 22:18)      PAST MEDICAL & SURGICAL HISTORY:  Cataract      OA (osteoarthritis)  knees      Occasional tremors      H/O seasonal allergies      Anxiety      Age-related cataract of both eyes, unspecified age-related cataract type  L          HPI:                PREVIOUS DIAGNOSTIC TESTING:      ECHO  FINDINGS:    STRESS  FINDINGS:    CATHETERIZATION  FINDINGS:    MEDICATIONS  (STANDING):  aspirin enteric coated 81 milliGRAM(s) Oral daily  atorvastatin 20 milliGRAM(s) Oral at bedtime  cefTRIAXone Injectable. 1000 milliGRAM(s) IV Push every 24 hours  clopidogrel Tablet 75 milliGRAM(s) Oral daily  dorzolamide 2% Ophthalmic Solution 1 Drop(s) Both EYES <User Schedule>  latanoprost 0.005% Ophthalmic Solution 1 Drop(s) Both EYES at bedtime  melatonin 3 milliGRAM(s) Oral at bedtime  sodium chloride 0.9%. 500 milliLiter(s) (100 mL/Hr) IV Continuous <Continuous>  tamsulosin 0.4 milliGRAM(s) Oral at bedtime    MEDICATIONS  (PRN):  acetaminophen     Tablet .. 650 milliGRAM(s) Oral every 6 hours PRN Temp greater or equal to 38C (100.4F), Mild Pain (1 - 3)  aluminum hydroxide/magnesium hydroxide/simethicone Suspension 30 milliLiter(s) Oral every 4 hours PRN Dyspepsia  calcium carbonate    500 mG (Tums) Chewable 3 Tablet(s) Chew every 6 hours PRN Dyspepsia  morphine  - Injectable 2 milliGRAM(s) IV Push every 5 minutes PRN Chest Pain  senna 2 Tablet(s) Oral at bedtime PRN Constipation      FAMILY HISTORY:      SOCIAL HISTORY:    CIGARETTES:    ALCOHOL:          Vital Signs Last 24 Hrs  T(C): 36.8 (26 Aug 2024 07:23), Max: 37.1 (25 Aug 2024 19:18)  T(F): 98.2 (26 Aug 2024 07:23), Max: 98.8 (25 Aug 2024 19:18)  HR: 82 (26 Aug 2024 07:23) (68 - 98)  BP: 118/71 (26 Aug 2024 07:23) (117/73 - 129/68)  BP(mean): 83 (26 Aug 2024 00:28) (83 - 93)  RR: 18 (26 Aug 2024 07:23) (16 - 18)  SpO2: 96% (26 Aug 2024 07:23) (94% - 98%)    Parameters below as of 26 Aug 2024 07:23  Patient On (Oxygen Delivery Method): nasal cannula  O2 Flow (L/min): 2      PHYSICAL EXAM-    Constitutional: The patient appears to be normal, well developed, well nourished and alert and oriented to time, place and person. The patient does not appear acutely ill. The patient is alert.     Head: Head is normocephalic and atraumatic.      Neck: The patient's neck is supple without enlargement, has no palpable thyromegaly nor thyroid nodules and has no jugular venous distention. No audible carotid bruits. There are strong carotid pulses bilaterally. No JVD.     Cardiovascular: Regular rate and rhythm without S3, S4. No murmurs or rubs are appreciated.      Respiratory: The patient has no rales and no rhonchi. The patient has no wheezes.     Abdomen: Soft, nontender, nondistended with positive bowel sounds.      Extremity: No tenderness. There is no pitting edema, skin discoloration, clubbing and cyanosis.           INTERPRETATION OF TELEMETRY:    ECG:    I&O's Detail      LABS:                        13.0   11.80 )-----------( 138      ( 26 Aug 2024 06:32 )             39.2     08-    141  |  112<H>  |  17  ----------------------------<  111<H>  3.6   |  21<L>  |  0.80    Ca    8.4<L>      26 Aug 2024 06:32  Phos  2.6       Mg     2.3         TPro  6.9  /  Alb  3.8  /  TBili  1.4<H>  /  DBili  x   /  AST  26  /  ALT  16  /  AlkPhos  77  08-25        PT/INR - ( 25 Aug 2024 14:50 )   PT: 14.1 sec;   INR: 1.26 ratio         PTT - ( 25 Aug 2024 14:50 )  PTT:37.2 sec  Urinalysis Basic - ( 26 Aug 2024 06:32 )    Color: x / Appearance: x / SG: x / pH: x  Gluc: 111 mg/dL / Ketone: x  / Bili: x / Urobili: x   Blood: x / Protein: x / Nitrite: x   Leuk Esterase: x / RBC: x / WBC x   Sq Epi: x / Non Sq Epi: x / Bacteria: x      I&O's Summary    BNP  RADIOLOGY & ADDITIONAL STUDIES:
Patient is a 89y old  Female who presents with a chief complaint of NSTEMI  Obstructing L UV junction stone (26 Aug 2024 16:07)      HPI:  89 year old female w anxiety, glaucoma and osteoarthritis came to ED w granddaughter c/o LLQ pain     +left lower quadrant x intermittent 1 month. The pain seems to have started after a fall in the shower about a month ago when she fractured her L clavicle   Recently pain seems to be moving around to the front LLQ, occasionally L back; grades pain as 7/10; not sure what increases or decreases pain  + nausea and vomiting the other night   denied fever, denied change in bowel or urination  +fatigue  +decreased appetite  Granddaughter aware that pt has been wheezing fr a couple of weeks  Saw ORTHO in follow up and is healing nicely w clavicle fx      In ED /80   HR 96   RR 16   T 98.7   93% sat RA  T wave inversions, trop 1700  ASA, plavix, lovenox  CT A/P for LLQ pain + 2mm obstructing UV jx stone w hydro    Being admitted for NSTEMI and obstructing ureteral stone  Echo revealing decreased LVEF 35%  No CP at present     S/p LHC revealing normal coronary arteries  Possible non-ischeamic 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, doing well      PAST MEDICAL HX  Anxiety   Cataract   Fall w L clavicle fx   Glaucoma  H/O seasonal allergies  Macular degeneration gets injections monthly   OA (osteoarthritis) knees  Occasional tremors      PAST SURGICAL HX  Age-related cataract of both eyes, R , L       FAMILY Hx  heart disease : Maternal Grandmother, Son who  age 61 w LBBB presumed MI      SOCIAL HX  lives alone  never smoker  started walking w a cane on her own   (25 Aug 2024 22:18)      PAST MEDICAL & SURGICAL HISTORY:  Cataract      OA (osteoarthritis)  knees      Occasional tremors      H/O seasonal allergies      Anxiety      Age-related cataract of both eyes, unspecified age-related cataract type  L          HPI:                PREVIOUS DIAGNOSTIC TESTING:      ECHO  FINDINGS:    STRESS  FINDINGS:    CATHETERIZATION  FINDINGS:    MEDICATIONS  (STANDING):  aspirin enteric coated 81 milliGRAM(s) Oral daily  atorvastatin 20 milliGRAM(s) Oral at bedtime  cefTRIAXone Injectable. 1000 milliGRAM(s) IV Push every 24 hours  clopidogrel Tablet 75 milliGRAM(s) Oral daily  dorzolamide 2% Ophthalmic Solution 1 Drop(s) Both EYES <User Schedule>  latanoprost 0.005% Ophthalmic Solution 1 Drop(s) Both EYES at bedtime  melatonin 3 milliGRAM(s) Oral at bedtime  sodium chloride 0.9%. 500 milliLiter(s) (100 mL/Hr) IV Continuous <Continuous>  tamsulosin 0.4 milliGRAM(s) Oral at bedtime    MEDICATIONS  (PRN):  acetaminophen     Tablet .. 650 milliGRAM(s) Oral every 6 hours PRN Temp greater or equal to 38C (100.4F), Mild Pain (1 - 3)  aluminum hydroxide/magnesium hydroxide/simethicone Suspension 30 milliLiter(s) Oral every 4 hours PRN Dyspepsia  calcium carbonate    500 mG (Tums) Chewable 3 Tablet(s) Chew every 6 hours PRN Dyspepsia  morphine  - Injectable 2 milliGRAM(s) IV Push every 5 minutes PRN Chest Pain  senna 2 Tablet(s) Oral at bedtime PRN Constipation      FAMILY HISTORY:      SOCIAL HISTORY:    CIGARETTES:    ALCOHOL:          Vital Signs Last 24 Hrs  T(C): 36.8 (26 Aug 2024 07:23), Max: 37.1 (25 Aug 2024 19:18)  T(F): 98.2 (26 Aug 2024 07:23), Max: 98.8 (25 Aug 2024 19:18)  HR: 82 (26 Aug 2024 07:23) (68 - 98)  BP: 118/71 (26 Aug 2024 07:23) (117/73 - 129/68)  BP(mean): 83 (26 Aug 2024 00:28) (83 - 93)  RR: 18 (26 Aug 2024 07:23) (16 - 18)  SpO2: 96% (26 Aug 2024 07:23) (94% - 98%)    Parameters below as of 26 Aug 2024 07:23  Patient On (Oxygen Delivery Method): nasal cannula  O2 Flow (L/min): 2      PHYSICAL EXAM-    Constitutional: The patient appears to be normal, well developed, well nourished and alert and oriented to time, place and person. The patient does not appear acutely ill. The patient is alert.     Head: Head is normocephalic and atraumatic.      Neck: The patient's neck is supple without enlargement, has no palpable thyromegaly nor thyroid nodules and has no jugular venous distention. No audible carotid bruits. There are strong carotid pulses bilaterally. No JVD.     Cardiovascular: Regular rate and rhythm without S3, S4. No murmurs or rubs are appreciated.      Respiratory: The patient has no rales and no rhonchi. The patient has no wheezes.     Abdomen: Soft, nontender, nondistended with positive bowel sounds.      Extremity: No tenderness. There is no pitting edema, skin discoloration, clubbing and cyanosis. Groin with no hematoma           INTERPRETATION OF TELEMETRY:    ECG:    I&O's Detail      LABS:                        13.0   11.80 )-----------( 138      ( 26 Aug 2024 06:32 )             39.2     08    141  |  112<H>  |  17  ----------------------------<  111<H>  3.6   |  21<L>  |  0.80    Ca    8.4<L>      26 Aug 2024 06:32  Phos  2.6       Mg     2.3         TPro  6.9  /  Alb  3.8  /  TBili  1.4<H>  /  DBili  x   /  AST  26  /  ALT  16  /  AlkPhos  77  08-25        PT/INR - ( 25 Aug 2024 14:50 )   PT: 14.1 sec;   INR: 1.26 ratio         PTT - ( 25 Aug 2024 14:50 )  PTT:37.2 sec  Urinalysis Basic - ( 26 Aug 2024 06:32 )    Color: x / Appearance: x / SG: x / pH: x  Gluc: 111 mg/dL / Ketone: x  / Bili: x / Urobili: x   Blood: x / Protein: x / Nitrite: x   Leuk Esterase: x / RBC: x / WBC x   Sq Epi: x / Non Sq Epi: x / Bacteria: x      I&O's Summary    BNP  RADIOLOGY & ADDITIONAL STUDIES:

## 2024-08-28 NOTE — DISCHARGE NOTE PROVIDER - NSDCFUADDAPPT_GEN_ALL_CORE_FT
NEW PCP :  Dr Michael Xie, [address], [address], [place] [address].  [[phone]]  Sept 16, 2024 @ cash Page to give patient/daughter a script for Outpatient PT      Follow Up Appointment: Cardiology	Rosaura RYDER  9/13 @9:30AM

## 2024-09-04 PROBLEM — Z00.00 ENCOUNTER FOR PREVENTIVE HEALTH EXAMINATION: Status: ACTIVE | Noted: 2024-09-04

## 2024-09-10 DIAGNOSIS — M17.0 BILATERAL PRIMARY OSTEOARTHRITIS OF KNEE: ICD-10-CM

## 2024-09-10 DIAGNOSIS — E87.6 HYPOKALEMIA: ICD-10-CM

## 2024-09-10 DIAGNOSIS — H40.9 UNSPECIFIED GLAUCOMA: ICD-10-CM

## 2024-09-10 DIAGNOSIS — Z91.040 LATEX ALLERGY STATUS: ICD-10-CM

## 2024-09-10 DIAGNOSIS — I42.8 OTHER CARDIOMYOPATHIES: ICD-10-CM

## 2024-09-10 DIAGNOSIS — I50.43 ACUTE ON CHRONIC COMBINED SYSTOLIC (CONGESTIVE) AND DIASTOLIC (CONGESTIVE) HEART FAILURE: ICD-10-CM

## 2024-09-10 DIAGNOSIS — F41.9 ANXIETY DISORDER, UNSPECIFIED: ICD-10-CM

## 2024-09-10 DIAGNOSIS — H35.30 UNSPECIFIED MACULAR DEGENERATION: ICD-10-CM

## 2024-09-10 DIAGNOSIS — E83.42 HYPOMAGNESEMIA: ICD-10-CM

## 2024-09-10 DIAGNOSIS — N13.6 PYONEPHROSIS: ICD-10-CM

## 2024-09-10 DIAGNOSIS — I21.A1 MYOCARDIAL INFARCTION TYPE 2: ICD-10-CM

## 2024-09-10 DIAGNOSIS — Z82.49 FAMILY HISTORY OF ISCHEMIC HEART DISEASE AND OTHER DISEASES OF THE CIRCULATORY SYSTEM: ICD-10-CM

## 2024-09-10 DIAGNOSIS — B96.20 UNSPECIFIED ESCHERICHIA COLI [E. COLI] AS THE CAUSE OF DISEASES CLASSIFIED ELSEWHERE: ICD-10-CM

## 2024-09-16 ENCOUNTER — TRANSCRIPTION ENCOUNTER (OUTPATIENT)
Age: 89
End: 2024-09-16

## 2024-09-16 ENCOUNTER — NON-APPOINTMENT (OUTPATIENT)
Age: 89
End: 2024-09-16

## 2024-09-16 ENCOUNTER — APPOINTMENT (OUTPATIENT)
Dept: FAMILY MEDICINE | Facility: CLINIC | Age: 89
End: 2024-09-16
Payer: MEDICARE

## 2024-09-16 VITALS
BODY MASS INDEX: 29.16 KG/M2 | SYSTOLIC BLOOD PRESSURE: 138 MMHG | TEMPERATURE: 97.2 F | DIASTOLIC BLOOD PRESSURE: 80 MMHG | HEIGHT: 65 IN | OXYGEN SATURATION: 96 % | WEIGHT: 175 LBS | HEART RATE: 97 BPM

## 2024-09-16 DIAGNOSIS — Z83.49 FAMILY HISTORY OF OTHER ENDOCRINE, NUTRITIONAL AND METABOLIC DISEASES: ICD-10-CM

## 2024-09-16 DIAGNOSIS — H40.1134 PRIMARY OPEN-ANGLE GLAUCOMA, BILATERAL, INDETERMINATE STAGE: ICD-10-CM

## 2024-09-16 DIAGNOSIS — Z78.9 OTHER SPECIFIED HEALTH STATUS: ICD-10-CM

## 2024-09-16 DIAGNOSIS — Z83.3 FAMILY HISTORY OF DIABETES MELLITUS: ICD-10-CM

## 2024-09-16 DIAGNOSIS — H35.3230 EXUDATIVE AGE-RELATED MACULAR DEGENERATION, BILATERAL, STAGE UNSPECIFIED: ICD-10-CM

## 2024-09-16 DIAGNOSIS — Z63.4 DISAPPEARANCE AND DEATH OF FAMILY MEMBER: ICD-10-CM

## 2024-09-16 DIAGNOSIS — Z82.3 FAMILY HISTORY OF STROKE: ICD-10-CM

## 2024-09-16 DIAGNOSIS — I42.9 CARDIOMYOPATHY, UNSPECIFIED: ICD-10-CM

## 2024-09-16 DIAGNOSIS — Z82.49 FAMILY HISTORY OF ISCHEMIC HEART DISEASE AND OTHER DISEASES OF THE CIRCULATORY SYSTEM: ICD-10-CM

## 2024-09-16 PROCEDURE — 99204 OFFICE O/P NEW MOD 45 MIN: CPT

## 2024-09-16 RX ORDER — METOPROLOL SUCCINATE 50 MG/1
50 TABLET, EXTENDED RELEASE ORAL
Refills: 0 | Status: ACTIVE | COMMUNITY

## 2024-09-16 RX ORDER — LATANOPROST/PF 0.005 %
0.01 DROPS OPHTHALMIC (EYE)
Refills: 0 | Status: ACTIVE | COMMUNITY

## 2024-09-16 RX ORDER — DORZOLAMIDE HYDROCHLORIDE 20 MG/ML
2 SOLUTION OPHTHALMIC
Refills: 0 | Status: ACTIVE | COMMUNITY

## 2024-09-16 RX ORDER — ATORVASTATIN CALCIUM 20 MG/1
20 TABLET, FILM COATED ORAL
Refills: 0 | Status: ACTIVE | COMMUNITY

## 2024-09-16 RX ORDER — ASPIRIN 81 MG
81 TABLET, DELAYED RELEASE (ENTERIC COATED) ORAL
Refills: 0 | Status: ACTIVE | COMMUNITY

## 2024-09-16 RX ORDER — LISINOPRIL 2.5 MG/1
2.5 TABLET ORAL
Refills: 0 | Status: ACTIVE | COMMUNITY

## 2024-09-16 SDOH — SOCIAL STABILITY - SOCIAL INSECURITY: DISSAPEARANCE AND DEATH OF FAMILY MEMBER: Z63.4

## 2024-09-16 NOTE — HEALTH RISK ASSESSMENT
[1 or 2 (0 pts)] : 1 or 2 (0 points) [Never (0 pts)] : Never (0 points) [No] : In the past 12 months have you used drugs other than those required for medical reasons? No [No falls in past year] : Patient reported no falls in the past year [0] : 2) Feeling down, depressed, or hopeless: Not at all (0) [PHQ-2 Negative - No further assessment needed] : PHQ-2 Negative - No further assessment needed [Never] : Never [Audit-CScore] : 0 [[id number]] : 0

## 2024-09-16 NOTE — PLAN
[FreeTextEntry1] : Advised at length Rx for Walker Seeing Dr Higginbotham for cardiology Physical therapy has Rx needs to go

## 2024-09-16 NOTE — HISTORY OF PRESENT ILLNESS
[FreeTextEntry8] : New pt is here for a hospital follow up. Was seen in  and discharged home on 8/28 Seen for 2 mm kidney stone and cardiomyopathy

## 2024-11-21 ENCOUNTER — APPOINTMENT (OUTPATIENT)
Dept: FAMILY MEDICINE | Facility: CLINIC | Age: 89
End: 2024-11-21
Payer: MEDICARE

## 2024-11-21 VITALS
HEART RATE: 58 BPM | HEIGHT: 65 IN | WEIGHT: 175 LBS | BODY MASS INDEX: 29.16 KG/M2 | OXYGEN SATURATION: 96 % | SYSTOLIC BLOOD PRESSURE: 130 MMHG | TEMPERATURE: 97.7 F | DIASTOLIC BLOOD PRESSURE: 78 MMHG

## 2024-11-21 DIAGNOSIS — I42.9 CARDIOMYOPATHY, UNSPECIFIED: ICD-10-CM

## 2024-11-21 DIAGNOSIS — E78.5 HYPERLIPIDEMIA, UNSPECIFIED: ICD-10-CM

## 2024-11-21 DIAGNOSIS — I10 ESSENTIAL (PRIMARY) HYPERTENSION: ICD-10-CM

## 2024-11-21 PROCEDURE — 36415 COLL VENOUS BLD VENIPUNCTURE: CPT

## 2024-11-21 PROCEDURE — G2211 COMPLEX E/M VISIT ADD ON: CPT

## 2024-11-21 PROCEDURE — 99214 OFFICE O/P EST MOD 30 MIN: CPT

## 2024-11-22 LAB
ALBUMIN SERPL ELPH-MCNC: 4.4 G/DL
ALP BLD-CCNC: 68 U/L
ALT SERPL-CCNC: 6 U/L
ANION GAP SERPL CALC-SCNC: 9 MMOL/L
AST SERPL-CCNC: 15 U/L
BASOPHILS # BLD AUTO: 0.06 K/UL
BASOPHILS NFR BLD AUTO: 0.8 %
BILIRUB SERPL-MCNC: 0.8 MG/DL
BUN SERPL-MCNC: 16 MG/DL
CALCIUM SERPL-MCNC: 9.3 MG/DL
CHLORIDE SERPL-SCNC: 108 MMOL/L
CHOLEST SERPL-MCNC: 92 MG/DL
CO2 SERPL-SCNC: 25 MMOL/L
CREAT SERPL-MCNC: 0.75 MG/DL
EGFR: 76 ML/MIN/1.73M2
EOSINOPHIL # BLD AUTO: 0.07 K/UL
EOSINOPHIL NFR BLD AUTO: 0.9 %
GLUCOSE SERPL-MCNC: 105 MG/DL
HCT VFR BLD CALC: 41 %
HDLC SERPL-MCNC: 36 MG/DL
HGB BLD-MCNC: 13.4 G/DL
IMM GRANULOCYTES NFR BLD AUTO: 0.7 %
LDLC SERPL CALC-MCNC: 39 MG/DL
LYMPHOCYTES # BLD AUTO: 2.48 K/UL
LYMPHOCYTES NFR BLD AUTO: 32.4 %
MAN DIFF?: NORMAL
MCHC RBC-ENTMCNC: 28.3 PG
MCHC RBC-ENTMCNC: 32.7 G/DL
MCV RBC AUTO: 86.5 FL
MONOCYTES # BLD AUTO: 0.87 K/UL
MONOCYTES NFR BLD AUTO: 11.4 %
NEUTROPHILS # BLD AUTO: 4.12 K/UL
NEUTROPHILS NFR BLD AUTO: 53.8 %
NONHDLC SERPL-MCNC: 57 MG/DL
PLATELET # BLD AUTO: 143 K/UL
POTASSIUM SERPL-SCNC: 4.2 MMOL/L
PROT SERPL-MCNC: 6.6 G/DL
RBC # BLD: 4.74 M/UL
RBC # FLD: 15 %
SODIUM SERPL-SCNC: 141 MMOL/L
TRIGL SERPL-MCNC: 88 MG/DL
WBC # FLD AUTO: 7.65 K/UL

## 2025-03-20 NOTE — ED ADULT TRIAGE NOTE - GLASGOW COMA SCALE: SCORE, MLM
Pt is on schedule   
Referral from Dr. Calderon and Dr. Mendoza for POEM.   Discussed with Paulina LOMBARDO. PSR - please call pt to schedule consult visit with Dr. Solano at next available.   
15